# Patient Record
Sex: FEMALE | Race: WHITE | Employment: OTHER | URBAN - NONMETROPOLITAN AREA
[De-identification: names, ages, dates, MRNs, and addresses within clinical notes are randomized per-mention and may not be internally consistent; named-entity substitution may affect disease eponyms.]

---

## 2019-07-16 ENCOUNTER — OFFICE VISIT (OUTPATIENT)
Dept: PRIMARY CARE CLINIC | Age: 64
End: 2019-07-16
Payer: MEDICARE

## 2019-07-16 VITALS
OXYGEN SATURATION: 98 % | WEIGHT: 116 LBS | HEIGHT: 65 IN | HEART RATE: 82 BPM | TEMPERATURE: 97.4 F | SYSTOLIC BLOOD PRESSURE: 134 MMHG | BODY MASS INDEX: 19.33 KG/M2 | RESPIRATION RATE: 22 BRPM | DIASTOLIC BLOOD PRESSURE: 80 MMHG

## 2019-07-16 DIAGNOSIS — R10.13 EPIGASTRIC PAIN: ICD-10-CM

## 2019-07-16 DIAGNOSIS — Z80.0 FAMILY HISTORY OF PANCREATIC CANCER: ICD-10-CM

## 2019-07-16 DIAGNOSIS — M54.41 CHRONIC BILATERAL LOW BACK PAIN WITH BILATERAL SCIATICA: Primary | ICD-10-CM

## 2019-07-16 DIAGNOSIS — M54.16 LUMBAR RADICULOPATHY: ICD-10-CM

## 2019-07-16 DIAGNOSIS — Z23 NEED FOR PNEUMOCOCCAL VACCINATION: ICD-10-CM

## 2019-07-16 DIAGNOSIS — G89.29 CHRONIC BILATERAL LOW BACK PAIN WITH BILATERAL SCIATICA: Primary | ICD-10-CM

## 2019-07-16 DIAGNOSIS — K52.9 CHRONIC DIARRHEA: ICD-10-CM

## 2019-07-16 DIAGNOSIS — M54.42 CHRONIC BILATERAL LOW BACK PAIN WITH BILATERAL SCIATICA: Primary | ICD-10-CM

## 2019-07-16 DIAGNOSIS — F33.1 MODERATE EPISODE OF RECURRENT MAJOR DEPRESSIVE DISORDER (HCC): ICD-10-CM

## 2019-07-16 DIAGNOSIS — F17.200 SMOKER: ICD-10-CM

## 2019-07-16 DIAGNOSIS — I10 ESSENTIAL HYPERTENSION: ICD-10-CM

## 2019-07-16 DIAGNOSIS — R63.4 WEIGHT LOSS: ICD-10-CM

## 2019-07-16 DIAGNOSIS — Z87.891 PERSONAL HISTORY OF TOBACCO USE: ICD-10-CM

## 2019-07-16 LAB
ALBUMIN SERPL-MCNC: 4.4 G/DL (ref 3.5–5.2)
ALP BLD-CCNC: 85 U/L (ref 35–104)
ALT SERPL-CCNC: 9 U/L (ref 5–33)
AMYLASE: 93 U/L (ref 28–100)
ANION GAP SERPL CALCULATED.3IONS-SCNC: 15 MMOL/L (ref 7–19)
AST SERPL-CCNC: 14 U/L (ref 5–32)
BASOPHILS ABSOLUTE: 0.1 K/UL (ref 0–0.2)
BASOPHILS RELATIVE PERCENT: 0.9 % (ref 0–1)
BILIRUB SERPL-MCNC: <0.2 MG/DL (ref 0.2–1.2)
BUN BLDV-MCNC: 13 MG/DL (ref 8–23)
CALCIUM SERPL-MCNC: 9.2 MG/DL (ref 8.8–10.2)
CHLORIDE BLD-SCNC: 105 MMOL/L (ref 98–111)
CO2: 22 MMOL/L (ref 22–29)
CREAT SERPL-MCNC: 0.7 MG/DL (ref 0.5–0.9)
EOSINOPHILS ABSOLUTE: 0.1 K/UL (ref 0–0.6)
EOSINOPHILS RELATIVE PERCENT: 1.3 % (ref 0–5)
GFR NON-AFRICAN AMERICAN: >60
GLUCOSE BLD-MCNC: 105 MG/DL (ref 74–109)
HCT VFR BLD CALC: 46.7 % (ref 37–47)
HEMOGLOBIN: 14.8 G/DL (ref 12–16)
LIPASE: 31 U/L (ref 13–60)
LYMPHOCYTES ABSOLUTE: 2.2 K/UL (ref 1.1–4.5)
LYMPHOCYTES RELATIVE PERCENT: 23.4 % (ref 20–40)
MCH RBC QN AUTO: 30.5 PG (ref 27–31)
MCHC RBC AUTO-ENTMCNC: 31.7 G/DL (ref 33–37)
MCV RBC AUTO: 96.1 FL (ref 81–99)
MONOCYTES ABSOLUTE: 0.7 K/UL (ref 0–0.9)
MONOCYTES RELATIVE PERCENT: 6.9 % (ref 0–10)
NEUTROPHILS ABSOLUTE: 6.3 K/UL (ref 1.5–7.5)
NEUTROPHILS RELATIVE PERCENT: 67.2 % (ref 50–65)
PDW BLD-RTO: 13 % (ref 11.5–14.5)
PLATELET # BLD: 260 K/UL (ref 130–400)
PMV BLD AUTO: 10.6 FL (ref 9.4–12.3)
POTASSIUM SERPL-SCNC: 3.6 MMOL/L (ref 3.5–5)
RBC # BLD: 4.86 M/UL (ref 4.2–5.4)
SODIUM BLD-SCNC: 142 MMOL/L (ref 136–145)
TOTAL PROTEIN: 7.6 G/DL (ref 6.6–8.7)
TSH SERPL DL<=0.05 MIU/L-ACNC: 1.18 UIU/ML (ref 0.27–4.2)
WBC # BLD: 9.4 K/UL (ref 4.8–10.8)

## 2019-07-16 PROCEDURE — G0296 VISIT TO DETERM LDCT ELIG: HCPCS | Performed by: FAMILY MEDICINE

## 2019-07-16 PROCEDURE — G0009 ADMIN PNEUMOCOCCAL VACCINE: HCPCS | Performed by: FAMILY MEDICINE

## 2019-07-16 PROCEDURE — 99204 OFFICE O/P NEW MOD 45 MIN: CPT | Performed by: FAMILY MEDICINE

## 2019-07-16 PROCEDURE — 36415 COLL VENOUS BLD VENIPUNCTURE: CPT | Performed by: FAMILY MEDICINE

## 2019-07-16 PROCEDURE — 90670 PCV13 VACCINE IM: CPT | Performed by: FAMILY MEDICINE

## 2019-07-16 RX ORDER — AMLODIPINE BESYLATE 2.5 MG/1
2.5 TABLET ORAL DAILY
COMMUNITY
End: 2020-04-21

## 2019-07-16 RX ORDER — FLUOXETINE HYDROCHLORIDE 40 MG/1
40 CAPSULE ORAL DAILY
COMMUNITY
End: 2019-10-16 | Stop reason: ALTCHOICE

## 2019-07-16 RX ORDER — M-VIT,TX,IRON,MINS/CALC/FOLIC 27MG-0.4MG
1 TABLET ORAL DAILY
COMMUNITY
End: 2019-10-16

## 2019-07-16 RX ORDER — TRAZODONE HYDROCHLORIDE 100 MG/1
100 TABLET ORAL NIGHTLY
COMMUNITY
End: 2019-11-21 | Stop reason: SDUPTHER

## 2019-07-16 RX ORDER — LORATADINE 10 MG/1
10 TABLET ORAL DAILY
COMMUNITY
End: 2019-10-16

## 2019-07-16 RX ORDER — GABAPENTIN 300 MG/1
300 CAPSULE ORAL 3 TIMES DAILY
Qty: 90 CAPSULE | Refills: 5 | Status: SHIPPED | OUTPATIENT
Start: 2019-07-16 | End: 2019-08-19 | Stop reason: SDUPTHER

## 2019-07-16 RX ORDER — ROPINIROLE 0.5 MG/1
0.5 TABLET, FILM COATED ORAL 2 TIMES DAILY
COMMUNITY
End: 2019-07-16 | Stop reason: ALTCHOICE

## 2019-07-16 RX ORDER — TOPIRAMATE 50 MG/1
50 TABLET, FILM COATED ORAL 2 TIMES DAILY
COMMUNITY
End: 2020-07-06 | Stop reason: SDUPTHER

## 2019-07-16 ASSESSMENT — ENCOUNTER SYMPTOMS
NAUSEA: 0
COUGH: 0
WHEEZING: 0
VOMITING: 0
EYE DISCHARGE: 0
BACK PAIN: 1
COLOR CHANGE: 0
DIARRHEA: 1
ABDOMINAL PAIN: 0

## 2019-07-16 ASSESSMENT — PATIENT HEALTH QUESTIONNAIRE - PHQ9
1. LITTLE INTEREST OR PLEASURE IN DOING THINGS: 0
SUM OF ALL RESPONSES TO PHQ QUESTIONS 1-9: 0
SUM OF ALL RESPONSES TO PHQ9 QUESTIONS 1 & 2: 0
SUM OF ALL RESPONSES TO PHQ QUESTIONS 1-9: 0
2. FEELING DOWN, DEPRESSED OR HOPELESS: 0

## 2019-07-17 ENCOUNTER — TRANSCRIBE ORDERS (OUTPATIENT)
Dept: ADMINISTRATIVE | Facility: HOSPITAL | Age: 64
End: 2019-07-17

## 2019-07-17 DIAGNOSIS — Z80.0 FHX: PANCREATIC CANCER: ICD-10-CM

## 2019-07-17 DIAGNOSIS — Z80.0 FH: PANCREATIC CANCER: Primary | ICD-10-CM

## 2019-07-17 DIAGNOSIS — Z87.891 PERSONAL HISTORY OF TOBACCO USE, PRESENTING HAZARDS TO HEALTH: ICD-10-CM

## 2019-07-23 ENCOUNTER — APPOINTMENT (OUTPATIENT)
Dept: CT IMAGING | Facility: HOSPITAL | Age: 64
End: 2019-07-23

## 2019-07-26 ENCOUNTER — APPOINTMENT (OUTPATIENT)
Dept: CT IMAGING | Facility: HOSPITAL | Age: 64
End: 2019-07-26

## 2019-08-06 ENCOUNTER — HOSPITAL ENCOUNTER (OUTPATIENT)
Dept: CT IMAGING | Facility: HOSPITAL | Age: 64
Discharge: HOME | End: 2019-08-06
Admitting: FAMILY MEDICINE

## 2019-08-06 DIAGNOSIS — Z87.891 PERSONAL HISTORY OF TOBACCO USE, PRESENTING HAZARDS TO HEALTH: ICD-10-CM

## 2019-08-06 DIAGNOSIS — R63.4 WEIGHT LOSS: ICD-10-CM

## 2019-08-06 DIAGNOSIS — F17.200 SMOKER: ICD-10-CM

## 2019-08-06 DIAGNOSIS — Z87.891 PERSONAL HISTORY OF TOBACCO USE: ICD-10-CM

## 2019-08-06 DIAGNOSIS — K52.9 CHRONIC DIARRHEA: ICD-10-CM

## 2019-08-06 DIAGNOSIS — R10.13 EPIGASTRIC PAIN: ICD-10-CM

## 2019-08-06 DIAGNOSIS — Z80.0 FAMILY HISTORY OF PANCREATIC CANCER: ICD-10-CM

## 2019-08-06 DIAGNOSIS — Z80.0 FHX: PANCREATIC CANCER: ICD-10-CM

## 2019-08-06 DIAGNOSIS — R93.89 ABNORMAL CHEST CT: ICD-10-CM

## 2019-08-06 DIAGNOSIS — R91.8 LUNG MASS: Primary | ICD-10-CM

## 2019-08-06 LAB — CREAT BLDA-MCNC: 0.9 MG/DL (ref 0.6–1.3)

## 2019-08-06 PROCEDURE — 25010000002 IOPAMIDOL 61 % SOLUTION: Performed by: FAMILY MEDICINE

## 2019-08-06 PROCEDURE — G0297 LDCT FOR LUNG CA SCREEN: HCPCS

## 2019-08-06 PROCEDURE — 74177 CT ABD & PELVIS W/CONTRAST: CPT

## 2019-08-06 PROCEDURE — 82565 ASSAY OF CREATININE: CPT

## 2019-08-06 RX ADMIN — IOPAMIDOL 50 ML: 612 INJECTION, SOLUTION INTRAVENOUS at 08:59

## 2019-08-06 RX ADMIN — IOPAMIDOL 100 ML: 612 INJECTION, SOLUTION INTRAVENOUS at 08:59

## 2019-08-08 ENCOUNTER — TRANSCRIBE ORDERS (OUTPATIENT)
Dept: ADMINISTRATIVE | Facility: HOSPITAL | Age: 64
End: 2019-08-08

## 2019-08-08 ENCOUNTER — TELEPHONE (OUTPATIENT)
Dept: PRIMARY CARE CLINIC | Age: 64
End: 2019-08-08

## 2019-08-08 DIAGNOSIS — F17.200 SMOKER: ICD-10-CM

## 2019-08-08 DIAGNOSIS — R93.89 ABNORMAL CHEST CT: ICD-10-CM

## 2019-08-08 DIAGNOSIS — R91.8 LUNG MASS: Primary | ICD-10-CM

## 2019-08-08 DIAGNOSIS — R63.4 WEIGHT LOSS: ICD-10-CM

## 2019-08-12 DIAGNOSIS — R93.89 ABNORMAL CT SCAN: ICD-10-CM

## 2019-08-12 DIAGNOSIS — R63.4 LOSS OF WEIGHT: ICD-10-CM

## 2019-08-12 DIAGNOSIS — R91.1 LUNG NODULE: Primary | ICD-10-CM

## 2019-08-12 DIAGNOSIS — F17.200 SMOKES: ICD-10-CM

## 2019-08-14 ENCOUNTER — HOSPITAL ENCOUNTER (OUTPATIENT)
Dept: CT IMAGING | Facility: HOSPITAL | Age: 64
Discharge: HOME | End: 2019-08-14

## 2019-08-14 ENCOUNTER — HOSPITAL ENCOUNTER (OUTPATIENT)
Dept: CT IMAGING | Facility: HOSPITAL | Age: 64
Discharge: HOME | End: 2019-08-14
Admitting: FAMILY MEDICINE

## 2019-08-14 DIAGNOSIS — R91.8 LUNG MASS: ICD-10-CM

## 2019-08-14 DIAGNOSIS — F17.200 SMOKER: ICD-10-CM

## 2019-08-14 DIAGNOSIS — R93.89 ABNORMAL CHEST CT: ICD-10-CM

## 2019-08-14 DIAGNOSIS — R63.4 WEIGHT LOSS: ICD-10-CM

## 2019-08-14 PROCEDURE — 0 FLUDEOXYGLUCOSE F18 SOLUTION: Performed by: FAMILY MEDICINE

## 2019-08-14 PROCEDURE — A9552 F18 FDG: HCPCS | Performed by: FAMILY MEDICINE

## 2019-08-14 PROCEDURE — 78815 PET IMAGE W/CT SKULL-THIGH: CPT

## 2019-08-14 RX ADMIN — FLUDEOXYGLUCOSE F18 1 DOSE: 300 INJECTION INTRAVENOUS at 08:59

## 2019-08-19 DIAGNOSIS — M54.16 LUMBAR RADICULOPATHY: ICD-10-CM

## 2019-08-19 DIAGNOSIS — M54.41 CHRONIC BILATERAL LOW BACK PAIN WITH BILATERAL SCIATICA: ICD-10-CM

## 2019-08-19 DIAGNOSIS — M54.42 CHRONIC BILATERAL LOW BACK PAIN WITH BILATERAL SCIATICA: ICD-10-CM

## 2019-08-19 DIAGNOSIS — G89.29 CHRONIC BILATERAL LOW BACK PAIN WITH BILATERAL SCIATICA: ICD-10-CM

## 2019-08-19 RX ORDER — GABAPENTIN 300 MG/1
300 CAPSULE ORAL 3 TIMES DAILY
Qty: 90 CAPSULE | Refills: 0 | Status: SHIPPED | OUTPATIENT
Start: 2019-08-19 | End: 2020-03-23

## 2019-09-09 NOTE — PROGRESS NOTES
Subjective   Georgina Bernard is a 63 y.o. female.     Background: Pt with 70 pk year smoking hx, 13 mm LLL nodule, 4 mm rml nodule.  PET scan indicated 12 mm intraficssural nodule and 3 mm rml nodule    Chief Complaint   Patient presents with   • abnormal CT Scan        History of Present Illness   Pt reports moderate intermittent dyspnea on exertion in chest associated with wheeze and alleviated by rest and not helped well by inhalers.  She underwent a sleep study in [place] and was told she needs oxygen at night.     Medical/Family/Social History   has a past medical history of Arthritis and Hypertension.   has a past surgical history that includes Hysterectomy; Cholecystectomy; Appendectomy; Gallbladder surgery; Hysterectomy; Appendectomy; Tubal ligation; and Carpal tunnel release.  family history includes Cancer in her brother and mother; Heart failure in her father.   reports that she has been smoking.  She has a 20.00 pack-year smoking history. She has never used smokeless tobacco. She reports that she does not drink alcohol or use drugs.  She is cutting back on smoking  Allergies   Allergen Reactions   • Morphine Hives     Medications    Current Outpatient Medications:   •  albuterol sulfate  (90 Base) MCG/ACT inhaler, INHALE 2 PUFFS INTO THE LUNGS EVERY 6HRS AS NEEDED FOR WHEEZING, Disp: , Rfl: 2  •  amLODIPine (NORVASC) 2.5 MG tablet, Take 2.5 mg by mouth Daily., Disp: , Rfl: 0  •  aspirin 81 MG tablet, Take 81 mg by mouth., Disp: , Rfl:   •  Cholecalciferol (VITAMIN D3) 5000 units capsule capsule, Take  by mouth Daily., Disp: , Rfl: 4  •  FLUoxetine (PROzac) 40 MG capsule, Take 40 mg by mouth Daily., Disp: , Rfl: 1  •  gabapentin (NEURONTIN) 300 MG capsule, TAKE 1 CAPSULE BY MOUTH 3 TIMES DAILY  DAYS. INTENDED SUPPLY: 30 DAYS, Disp: , Rfl: 5  •  loratadine (CLARITIN) 10 MG tablet, Take 10 mg by mouth Daily., Disp: , Rfl: 10  •  topiramate (TOPAMAX) 50 MG tablet, Take 50 mg by mouth., Disp:  ", Rfl:   •  traZODone (DESYREL) 100 MG tablet, Take 100 mg by mouth., Disp: , Rfl:     Review of Systems   Constitutional: Negative for chills and fever.   HENT: Negative for trouble swallowing and voice change.    Eyes: Negative for photophobia and visual disturbance.   Respiratory: Positive for cough, shortness of breath and wheezing.    Gastrointestinal: Negative for abdominal pain, nausea, vomiting and GERD.   Genitourinary: Negative for difficulty urinating and hematuria.   Musculoskeletal: Negative for gait problem and joint swelling.   Skin: Negative for pallor and skin lesions.   Neurological: Negative for tremors, speech difficulty, weakness, light-headedness and confusion.   Hematological: Negative for adenopathy. Does not bruise/bleed easily.   Psychiatric/Behavioral: The patient is not nervous/anxious.      Objective   /70   Pulse 78   Ht 165.1 cm (65\")   Wt 53.5 kg (118 lb)   SpO2 97% Comment: RA  BMI 19.64 kg/m²   Physical Exam   Constitutional: She appears well-developed. She is active.  Non-toxic appearance. She does not have a sickly appearance. She does not appear ill. No distress.   HENT:   Head: Atraumatic.   Nose: Nose normal.   Eyes: Conjunctivae and EOM are normal. No scleral icterus.   Neck: Neck supple.   Cardiovascular: Normal rate, regular rhythm, S1 normal and S2 normal.   Pulmonary/Chest: Effort normal. No accessory muscle usage. She has decreased breath sounds. She has no wheezes.   Abdominal: Soft. She exhibits no distension. There is no tenderness.   Musculoskeletal: She exhibits no deformity.   Lymphadenopathy:     She has no cervical adenopathy.   Neurological: She is alert.   Skin: Skin is warm. No rash noted.   Psychiatric: She has a normal mood and affect.        -----------------------------------------------------------------------------------------------  Recent Imaging:  Nm Pet Skull Base To Mid Thigh    Result Date: 8/14/2019  Impression:  12 mm lingular " perifissural pulmonary nodule has low-level FDG uptake below physiologic baseline. Given location, shape, and degree of FDG uptake, this may represent an intrapulmonary lymph node. However, continued surveillance is recommended as neoplasm remains within the differential.  3 mm right middle lobe pulmonary nodule is too small to characterize by PET/CT. Continued CT surveillance is recommended given severe emphysema.  No other significant abnormalities identified. This report was finalized on 08/14/2019 11:32 by Dr. Dalton Sarkar MD.    EXAMINATION: CT CHEST LOW DOSE WO-      8/6/2019 8:56 AM CDT     HISTORY: PER HX OF TOBACCO ABUSE; Z87.891-Personal history of nicotine  dependence     In order to have a CT radiation dose as low as reasonably achievable  Automated Exposure Control was utilized for adjustment of the mA and/or  KV according to patient size.     DLP in mGycm= 59.     Routine protocol noncontrast low dose screening chest CT.     Heart size is normal.  Granulomatous lymph node calcification is seen at the right hilum.  There is no mediastinal mass.  No pleural effusion.     Mildly hyperexpanded lungs. A few calcified granulomas are present  within the right middle lobe.     4 x 3 mm right middle lobe nodule on series 3 image 111.     13 x 7 mm left lower lobe soft tissue nodule associated with the major  fissure on series 3 images 124-129.     Summary:  1. 4 mm right middle lobe and 13 mm left lower lobe lung nodules.  2. Based on Lung-RADS Version1.1 (Release date 2019) this represents a  lung RADS score of 4A (based on the size of the solid left lower lobe  nodule). Probably suspicious finding. Additional diagnostic testing will  be needed.  3. Management options include 3 month LDCT and PET/CT imaging.  4. The 4 mm right middle lobe lung nodule can be followed with LDCT in  one year.    This report was finalized on 08/06/2019 09:33 by Dr. Alfonso Horner MD.    My interpretation of CT: nodule left lower  field  -----------------------------------------------------------------------------------------------  Pulmonary Functions Testing Results:  FEV1   Date Value Ref Range Status   09/11/2019 51% liters Final     FVC   Date Value Ref Range Status   09/11/2019 63% liters Final     FEV1/FVC   Date Value Ref Range Status   09/11/2019 64.51 % Final     DLCO   Date Value Ref Range Status   09/11/2019 59% ml/mmHg sec Final      My interpretation of PFT: moderate airflow obstruction, reduced dlco  -----------------------------------------------------------------------------------------------  Assessment/Plan   Problem List Items Addressed This Visit        Respiratory    Lung nodules - Primary    Relevant Orders    CT Chest Without Contrast    Centrilobular emphysema (CMS/[facility])    Relevant Medications    albuterol sulfate  (90 Base) MCG/ACT inhaler    loratadine (CLARITIN) 10 MG tablet    Other Relevant Orders    Pulmonary Function Test (Completed)    Shortness of breath    Relevant Orders    Overnight Sleep Oximetry Study    Nocturnal hypoxemia    Relevant Orders    Overnight Sleep Oximetry Study      Other Visit Diagnoses     Stage 2 moderate COPD by GOLD classification (CMS/[facility])        Relevant Medications    albuterol sulfate  (90 Base) MCG/ACT inhaler    loratadine (CLARITIN) 10 MG tablet        Patient's Body mass index is 19.64 kg/m². BMI is below normal parameters. Recommendations include: treating the underlying disease process.      For the lung nodule, plan repeat ct in 3 mos.  Suspect intrafisureal LN, cannot rule out cancer.  For the copd, continue albuterol, add trelegy sample for 2 weeks.  Check alpha 1 antitrypsin for the emphysema  She probably has some pulmonary cachexia, although malignancy is not ruled out and so cancer related cachexia is in differential  Overnight oximetry for hypoxema       Electronically signed by Ba Wray MD, 9/11/2019, 11:54 AM

## 2019-09-11 ENCOUNTER — OFFICE VISIT (OUTPATIENT)
Dept: PULMONOLOGY | Facility: CLINIC | Age: 64
End: 2019-09-11

## 2019-09-11 VITALS
OXYGEN SATURATION: 97 % | DIASTOLIC BLOOD PRESSURE: 70 MMHG | SYSTOLIC BLOOD PRESSURE: 126 MMHG | HEIGHT: 65 IN | BODY MASS INDEX: 19.66 KG/M2 | WEIGHT: 118 LBS | HEART RATE: 78 BPM

## 2019-09-11 DIAGNOSIS — R91.8 LUNG NODULES: Primary | ICD-10-CM

## 2019-09-11 DIAGNOSIS — R06.02 SHORTNESS OF BREATH: ICD-10-CM

## 2019-09-11 DIAGNOSIS — G47.34 NOCTURNAL HYPOXEMIA: ICD-10-CM

## 2019-09-11 DIAGNOSIS — J43.2 CENTRILOBULAR EMPHYSEMA (HCC): ICD-10-CM

## 2019-09-11 DIAGNOSIS — J44.9 STAGE 2 MODERATE COPD BY GOLD CLASSIFICATION (HCC): ICD-10-CM

## 2019-09-11 LAB
DIFF CAP.CO: NORMAL ML/MMHG SEC
FEV1/FVC: 64.51 %
FEV1: NORMAL LITERS
FVC VOL RESPIRATORY: NORMAL LITERS

## 2019-09-11 PROCEDURE — 94010 BREATHING CAPACITY TEST: CPT | Performed by: INTERNAL MEDICINE

## 2019-09-11 PROCEDURE — 99204 OFFICE O/P NEW MOD 45 MIN: CPT | Performed by: INTERNAL MEDICINE

## 2019-09-11 PROCEDURE — 94729 DIFFUSING CAPACITY: CPT | Performed by: INTERNAL MEDICINE

## 2019-09-11 RX ORDER — LORATADINE 10 MG/1
10 TABLET ORAL DAILY
Refills: 10 | COMMUNITY
Start: 2019-08-12 | End: 2020-01-14 | Stop reason: ALTCHOICE

## 2019-09-11 RX ORDER — TOPIRAMATE 50 MG/1
50 TABLET, FILM COATED ORAL
COMMUNITY

## 2019-09-11 RX ORDER — GABAPENTIN 300 MG/1
CAPSULE ORAL
Refills: 5 | COMMUNITY
Start: 2019-08-19

## 2019-09-11 RX ORDER — AMLODIPINE BESYLATE 2.5 MG/1
2.5 TABLET ORAL DAILY
Refills: 0 | COMMUNITY
Start: 2019-08-29

## 2019-09-11 RX ORDER — ALBUTEROL SULFATE 90 UG/1
AEROSOL, METERED RESPIRATORY (INHALATION)
Refills: 2 | COMMUNITY
Start: 2019-06-06 | End: 2021-01-11 | Stop reason: SDUPTHER

## 2019-09-11 RX ORDER — TRAZODONE HYDROCHLORIDE 100 MG/1
100 TABLET ORAL
COMMUNITY

## 2019-09-11 RX ORDER — FLUOXETINE HYDROCHLORIDE 40 MG/1
40 CAPSULE ORAL DAILY
Refills: 1 | COMMUNITY
Start: 2019-08-12

## 2019-09-11 ASSESSMENT — PULMONARY FUNCTION TESTS: FEV1/FVC: 64.51

## 2019-09-23 DIAGNOSIS — R06.02 SHORTNESS OF BREATH: ICD-10-CM

## 2019-09-23 DIAGNOSIS — J44.9 STAGE 2 MODERATE COPD BY GOLD CLASSIFICATION (HCC): Primary | ICD-10-CM

## 2019-09-23 DIAGNOSIS — G47.34 NOCTURNAL HYPOXEMIA: ICD-10-CM

## 2019-09-23 NOTE — PROGRESS NOTES
Please call the patient regarding her abnormal result.  This confirms she needs oxygen at night as was recommended.  Can rx from vendor of choice

## 2019-09-24 DIAGNOSIS — J44.9 STAGE 2 MODERATE COPD BY GOLD CLASSIFICATION (HCC): ICD-10-CM

## 2019-09-24 NOTE — PROGRESS NOTES
Let pt know this looked ok.  Alpha one gene is normal.  Nothing else to do for that.  Keep follow up as planned

## 2019-10-03 NOTE — TELEPHONE ENCOUNTER
Patient called and is needing a refill for Trelegy. Patient stated that you wasn't certain if her insurance would cover it or not.

## 2019-10-16 ENCOUNTER — OFFICE VISIT (OUTPATIENT)
Dept: PRIMARY CARE CLINIC | Age: 64
End: 2019-10-16
Payer: MEDICARE

## 2019-10-16 VITALS
TEMPERATURE: 97.9 F | BODY MASS INDEX: 19.83 KG/M2 | DIASTOLIC BLOOD PRESSURE: 64 MMHG | WEIGHT: 119 LBS | HEART RATE: 76 BPM | OXYGEN SATURATION: 97 % | SYSTOLIC BLOOD PRESSURE: 136 MMHG | RESPIRATION RATE: 22 BRPM | HEIGHT: 65 IN

## 2019-10-16 DIAGNOSIS — Z72.0 TOBACCO ABUSE: ICD-10-CM

## 2019-10-16 DIAGNOSIS — R91.1 LUNG NODULE: ICD-10-CM

## 2019-10-16 DIAGNOSIS — M54.16 LUMBAR RADICULOPATHY: ICD-10-CM

## 2019-10-16 DIAGNOSIS — I10 ESSENTIAL HYPERTENSION: ICD-10-CM

## 2019-10-16 DIAGNOSIS — G89.29 CHRONIC BILATERAL LOW BACK PAIN WITH BILATERAL SCIATICA: Primary | ICD-10-CM

## 2019-10-16 DIAGNOSIS — Z23 NEED FOR INFLUENZA VACCINATION: ICD-10-CM

## 2019-10-16 DIAGNOSIS — M54.41 CHRONIC BILATERAL LOW BACK PAIN WITH BILATERAL SCIATICA: Primary | ICD-10-CM

## 2019-10-16 DIAGNOSIS — J44.9 STAGE 2 MODERATE COPD BY GOLD CLASSIFICATION (HCC): ICD-10-CM

## 2019-10-16 DIAGNOSIS — M54.42 CHRONIC BILATERAL LOW BACK PAIN WITH BILATERAL SCIATICA: Primary | ICD-10-CM

## 2019-10-16 PROCEDURE — 90653 IIV ADJUVANT VACCINE IM: CPT | Performed by: FAMILY MEDICINE

## 2019-10-16 PROCEDURE — 99214 OFFICE O/P EST MOD 30 MIN: CPT | Performed by: FAMILY MEDICINE

## 2019-10-16 PROCEDURE — G0008 ADMIN INFLUENZA VIRUS VAC: HCPCS | Performed by: FAMILY MEDICINE

## 2019-10-16 RX ORDER — ESCITALOPRAM OXALATE 10 MG/1
10 TABLET ORAL DAILY
Qty: 30 TABLET | Refills: 3 | Status: SHIPPED | OUTPATIENT
Start: 2019-10-16 | End: 2019-11-07 | Stop reason: SDUPTHER

## 2019-10-16 RX ORDER — VARENICLINE TARTRATE 0.5 MG/1
.5-1 TABLET, FILM COATED ORAL SEE ADMIN INSTRUCTIONS
Qty: 57 TABLET | Refills: 0 | Status: SHIPPED | OUTPATIENT
Start: 2019-10-16 | End: 2019-11-05 | Stop reason: SDUPTHER

## 2019-10-16 ASSESSMENT — ENCOUNTER SYMPTOMS
COLOR CHANGE: 0
VOMITING: 0
COUGH: 0
ABDOMINAL PAIN: 0
DIARRHEA: 0
NAUSEA: 0
EYE DISCHARGE: 0
WHEEZING: 0
BACK PAIN: 1

## 2019-11-05 RX ORDER — VARENICLINE TARTRATE 0.5 MG/1
TABLET, FILM COATED ORAL
Qty: 57 TABLET | Refills: 3 | Status: SHIPPED | OUTPATIENT
Start: 2019-11-05 | End: 2020-07-13

## 2019-11-07 RX ORDER — ESCITALOPRAM OXALATE 10 MG/1
10 TABLET ORAL DAILY
Qty: 90 TABLET | Refills: 3 | Status: SHIPPED | OUTPATIENT
Start: 2019-11-07 | End: 2019-11-21 | Stop reason: SDUPTHER

## 2019-11-21 ENCOUNTER — OFFICE VISIT (OUTPATIENT)
Dept: PRIMARY CARE CLINIC | Age: 64
End: 2019-11-21
Payer: MEDICARE

## 2019-11-21 VITALS
BODY MASS INDEX: 20.33 KG/M2 | SYSTOLIC BLOOD PRESSURE: 126 MMHG | DIASTOLIC BLOOD PRESSURE: 70 MMHG | HEIGHT: 65 IN | RESPIRATION RATE: 22 BRPM | TEMPERATURE: 98.1 F | WEIGHT: 122 LBS | OXYGEN SATURATION: 95 % | HEART RATE: 85 BPM

## 2019-11-21 DIAGNOSIS — J44.9 CHRONIC OBSTRUCTIVE PULMONARY DISEASE, UNSPECIFIED COPD TYPE (HCC): ICD-10-CM

## 2019-11-21 DIAGNOSIS — F41.9 ANXIETY: ICD-10-CM

## 2019-11-21 DIAGNOSIS — G89.29 CHRONIC BILATERAL LOW BACK PAIN WITHOUT SCIATICA: Primary | ICD-10-CM

## 2019-11-21 DIAGNOSIS — M54.50 CHRONIC BILATERAL LOW BACK PAIN WITHOUT SCIATICA: Primary | ICD-10-CM

## 2019-11-21 DIAGNOSIS — Z00.00 ROUTINE GENERAL MEDICAL EXAMINATION AT A HEALTH CARE FACILITY: ICD-10-CM

## 2019-11-21 PROCEDURE — 99214 OFFICE O/P EST MOD 30 MIN: CPT | Performed by: FAMILY MEDICINE

## 2019-11-21 PROCEDURE — G0438 PPPS, INITIAL VISIT: HCPCS | Performed by: FAMILY MEDICINE

## 2019-11-21 RX ORDER — ESCITALOPRAM OXALATE 20 MG/1
20 TABLET ORAL DAILY
Qty: 90 TABLET | Refills: 3 | Status: SHIPPED | OUTPATIENT
Start: 2019-11-21 | End: 2020-04-27

## 2019-11-21 RX ORDER — TRAZODONE HYDROCHLORIDE 150 MG/1
150 TABLET ORAL NIGHTLY
Qty: 90 TABLET | Refills: 3 | Status: SHIPPED | OUTPATIENT
Start: 2019-11-21 | End: 2020-04-27

## 2019-11-21 RX ORDER — TIZANIDINE 4 MG/1
4 TABLET ORAL 3 TIMES DAILY
Qty: 30 TABLET | Refills: 0 | Status: SHIPPED | OUTPATIENT
Start: 2019-11-21 | End: 2020-06-25 | Stop reason: SDUPTHER

## 2019-11-21 ASSESSMENT — ENCOUNTER SYMPTOMS
BACK PAIN: 1
COUGH: 0
NAUSEA: 0
DIARRHEA: 0
WHEEZING: 0
COLOR CHANGE: 0
VOMITING: 0
EYE DISCHARGE: 0
ABDOMINAL PAIN: 0

## 2019-11-21 ASSESSMENT — PATIENT HEALTH QUESTIONNAIRE - PHQ9
SUM OF ALL RESPONSES TO PHQ QUESTIONS 1-9: 0
SUM OF ALL RESPONSES TO PHQ QUESTIONS 1-9: 0

## 2019-11-21 ASSESSMENT — LIFESTYLE VARIABLES: HOW OFTEN DO YOU HAVE A DRINK CONTAINING ALCOHOL: 0

## 2019-11-26 ENCOUNTER — HOSPITAL ENCOUNTER (OUTPATIENT)
Dept: GENERAL RADIOLOGY | Age: 64
Discharge: HOME | End: 2019-11-26
Payer: MEDICARE

## 2019-11-26 DIAGNOSIS — G89.29 CHRONIC BILATERAL LOW BACK PAIN WITHOUT SCIATICA: ICD-10-CM

## 2019-11-26 DIAGNOSIS — M54.50 CHRONIC BILATERAL LOW BACK PAIN WITHOUT SCIATICA: ICD-10-CM

## 2019-11-26 PROCEDURE — 72100 X-RAY EXAM L-S SPINE 2/3 VWS: CPT

## 2019-11-27 DIAGNOSIS — G89.29 CHRONIC BILATERAL LOW BACK PAIN WITHOUT SCIATICA: Primary | ICD-10-CM

## 2019-11-27 DIAGNOSIS — M54.50 CHRONIC BILATERAL LOW BACK PAIN WITHOUT SCIATICA: Primary | ICD-10-CM

## 2019-12-12 ENCOUNTER — HOSPITAL ENCOUNTER (OUTPATIENT)
Dept: PHYSICAL THERAPY | Age: 64
Setting detail: THERAPIES SERIES
Discharge: HOME | End: 2019-12-12
Payer: MEDICARE

## 2019-12-12 PROCEDURE — 97162 PT EVAL MOD COMPLEX 30 MIN: CPT

## 2019-12-12 PROCEDURE — 97110 THERAPEUTIC EXERCISES: CPT

## 2019-12-12 ASSESSMENT — PAIN DESCRIPTION - DESCRIPTORS: DESCRIPTORS: DULL;ACHING;NAGGING

## 2019-12-12 ASSESSMENT — PAIN DESCRIPTION - FREQUENCY: FREQUENCY: CONTINUOUS

## 2019-12-12 ASSESSMENT — PAIN DESCRIPTION - LOCATION: LOCATION: BACK

## 2019-12-12 ASSESSMENT — PAIN SCALES - GENERAL: PAINLEVEL_OUTOF10: 8

## 2019-12-12 ASSESSMENT — PAIN DESCRIPTION - ORIENTATION: ORIENTATION: RIGHT;LEFT

## 2019-12-19 ENCOUNTER — HOSPITAL ENCOUNTER (OUTPATIENT)
Dept: PHYSICAL THERAPY | Age: 64
Setting detail: THERAPIES SERIES
Discharge: HOME | End: 2019-12-19
Payer: MEDICARE

## 2019-12-19 PROCEDURE — 97110 THERAPEUTIC EXERCISES: CPT

## 2019-12-19 ASSESSMENT — PAIN SCALES - GENERAL: PAINLEVEL_OUTOF10: 9

## 2019-12-19 ASSESSMENT — PAIN DESCRIPTION - ORIENTATION: ORIENTATION: RIGHT;LEFT

## 2019-12-19 ASSESSMENT — PAIN DESCRIPTION - PAIN TYPE: TYPE: CHRONIC PAIN

## 2019-12-19 ASSESSMENT — PAIN DESCRIPTION - LOCATION: LOCATION: BACK

## 2019-12-23 ENCOUNTER — HOSPITAL ENCOUNTER (OUTPATIENT)
Dept: PHYSICAL THERAPY | Age: 64
Setting detail: THERAPIES SERIES
Discharge: HOME | End: 2019-12-23
Payer: MEDICARE

## 2019-12-23 PROCEDURE — 97110 THERAPEUTIC EXERCISES: CPT

## 2019-12-23 ASSESSMENT — PAIN SCALES - GENERAL: PAINLEVEL_OUTOF10: 6

## 2019-12-23 ASSESSMENT — PAIN DESCRIPTION - PAIN TYPE: TYPE: CHRONIC PAIN

## 2019-12-23 ASSESSMENT — PAIN DESCRIPTION - ORIENTATION: ORIENTATION: RIGHT;LEFT

## 2019-12-23 ASSESSMENT — PAIN DESCRIPTION - LOCATION: LOCATION: BACK

## 2019-12-27 ENCOUNTER — HOSPITAL ENCOUNTER (OUTPATIENT)
Dept: PHYSICAL THERAPY | Age: 64
Setting detail: THERAPIES SERIES
End: 2019-12-27
Payer: MEDICARE

## 2020-01-02 ENCOUNTER — HOSPITAL ENCOUNTER (OUTPATIENT)
Dept: PHYSICAL THERAPY | Age: 65
Setting detail: THERAPIES SERIES
Discharge: HOME | End: 2020-01-02
Payer: MEDICARE

## 2020-01-02 PROCEDURE — 97110 THERAPEUTIC EXERCISES: CPT

## 2020-01-02 ASSESSMENT — PAIN DESCRIPTION - ORIENTATION: ORIENTATION: RIGHT

## 2020-01-02 ASSESSMENT — PAIN DESCRIPTION - LOCATION: LOCATION: BACK

## 2020-01-02 ASSESSMENT — PAIN SCALES - GENERAL: PAINLEVEL_OUTOF10: 9

## 2020-01-02 ASSESSMENT — PAIN DESCRIPTION - PAIN TYPE: TYPE: CHRONIC PAIN

## 2020-01-02 NOTE — PROGRESS NOTES
Physical Therapy  Daily Treatment Note  Date: 2020  Patient Name: Luis Nelson  MRN: [id number]     :   1955    Subjective:   General  Additional Pertinent Hx: 75year old female referred to PT with diagnosis of chronic bilateral low back pain without sciatica. Recent x-ray showed mild degenerative disc disease L5-S1.   Referring Practitioner: Carmine Farrell MD  PT Insurance Information: [facility] [employer] (pre-cert required)  Total # of Visits Approved: 8  Total # of Visits to Date: 4  Plan of Care/Certification Expiration Date: 20  Progress Note Due Date: 20  Subjective: the past 2 says have been bad, i needed help getting out of the car yesterday  Patient Currently in Pain: Yes  Pain Level: 9(8/10 post tx)  Pain Type: Chronic pain  Pain Location: Back  Pain Orientation: Right       Treatment Activities:          Exercises  Exercise 1: supine lower trunk rotation to left x 5 for 5\" -------L side appeared significantly longer to start so performed appropriate mobs 5\" x 5   x 2 sets  Exercise 2: supine right quadratus stretch to left x 5 for 5\" ea  Exercise 3: isometric right hip extension from [facility] position x 5 for 5\"   L today due to it being longer  Exercise 4: axial traction to legs bilaterally x 3 for 10\"  Exercise 5: right single leg bridge x 5   Exercise 6: DKTC x 5 (with towel)  Exercise 7: left isometric resisted hip flexion at 90/90 x 5 for 5\"  R today due it being shorter vs L  Exercise 8: pelvic tilts with folded towel x 10 (poor quality, little motion)  Exercise 9: abdominal series with folded towel under sacrum and with pelvic tilt: arms x 5 ea, legs x 5, arms/legs x 5  Exercise 10: sitting forward reach to left shoe x 5 for 5\"  Exercise 11: sitting right hip retraction x 5 for 5\"  Exercise 12: repeated sit to stand, gatito surface to lower x 1  (very painful)  Exercise 13: bilateral LAQ's x 10  Exercise 14: bilateral leg hamstring curls x 10 red (uncomfortable on R but able to complete unlike previous session)  Exercise 15: foot dorsiflexion with t-band (red) x 10  Exercise 16: standing trunk rotation to left with right glute  x 5  Exercise 17: L-stretch with right arm overhead and left out to side x 5   Exercise 18: standing left hip hikes x 5--NOT today  Exercise 19: bilateral hip abduction x 10 ea  Exercise 20: bilateral step ups x 5 ea ----side steps x 0---rechecked leg length post ex and =        Assessment:   Conditions Requiring Skilled Therapeutic Intervention  Body structures, Functions, Activity limitations: Decreased ADL status; Decreased ROM; Decreased strength;Decreased functional mobility ; Decreased balance;Decreased high-level IADLs; Increased pain;Decreased posture  Assessment: Patient tolerated session fairly well but again LLE was longer than R and required 2 sets of synergistic techniques to correct leg length. Will discuss with primary therapist about kinesiotaping technique for SI region since she feels SI belt not helping and uncomfortable. Able to perform DKTC today with ues of towel but sit to stand still raher painful to attempt  REQUIRES PT FOLLOW UP: Yes             Goals:  Short term goals  Time Frame for Short term goals: 3-4 weeks  Short term goal 1: Patient to be independent with HEP. Short term goal 2: Patient to be independent with trial use of SI belt and heel lift in left shoe. Short term goal 3: Patient to report greater ease performing sit to stand movements. Long term goals  Time Frame for Long term goals : 4-6 weeks  Long term goal 1: Patient to walk with less reported episodes of balance loss. Long term goal 2: Patient to have 25 degrees lumbar extension and 30 degrees right sidebending. Long term goal 3: Patient to have >= 4/5 right knee flexion and foot dorsiflexion, 4+/5 right knee extension. Long term goal 4: Patient to score <= 30% impairment on the Oswestry disability questionnaire.   Patient Goals   Patient goals : Decreased low back pain.    Plan:    Times per week: 2x per week  Plan weeks: 6 deepali  Current Treatment Recommendations: Strengthening, ROM, Gait Training, Modalities, Positioning, Home Exercise Program, Pain Management, Patient/Caregiver Education & Training, Manual Therapy - Joint Manipulation        Therapy Time   Individual Concurrent Group Co-treatment   Time In 5773         Time Out 1022         Minutes Shelley Lagunas PTA    Electronically signed by Jimena Bocanegra PTA on 1/2/2020 at 11:28 AM

## 2020-01-03 ENCOUNTER — HOSPITAL ENCOUNTER (OUTPATIENT)
Dept: PHYSICAL THERAPY | Age: 65
Setting detail: THERAPIES SERIES
Discharge: HOME | End: 2020-01-03
Payer: MEDICARE

## 2020-01-03 PROCEDURE — 97110 THERAPEUTIC EXERCISES: CPT

## 2020-01-03 ASSESSMENT — PAIN DESCRIPTION - LOCATION: LOCATION: BACK

## 2020-01-03 ASSESSMENT — PAIN SCALES - GENERAL: PAINLEVEL_OUTOF10: 10

## 2020-01-03 ASSESSMENT — PAIN DESCRIPTION - DESCRIPTORS: DESCRIPTORS: THROBBING;SORE;ACHING

## 2020-01-03 ASSESSMENT — PAIN DESCRIPTION - PAIN TYPE: TYPE: CHRONIC PAIN

## 2020-01-03 ASSESSMENT — PAIN DESCRIPTION - ORIENTATION: ORIENTATION: RIGHT

## 2020-01-03 NOTE — PROGRESS NOTES
Daily Treatment Note  Date: 1/3/2020  Patient Name: Sandra Abdalla  MRN: [id number]     :   1955    Subjective:   General  Additional Pertinent Hx: 75year old female referred to PT with diagnosis of chronic bilateral low back pain without sciatica. Recent x-ray showed mild degenerative disc disease L5-S1. Response To Previous Treatment: Patient with no complaints from previous session. Family / Caregiver Present: Yes(sister)  Referring Practitioner: Lexii Dumas MD  PT Visit Information  PT Insurance Information: [facility] [employer] (pre-cert required)  Total # of Visits Approved: 8  Total # of Visits to Date: 5  Plan of Care/Certification Expiration Date: 20  Progress Note Due Date: 20  Subjective  Subjective: Bad night. Up and down all night long. Pain Screening  Patient Currently in Pain: Yes  Pain Assessment  Pain Assessment: 0-10  Pain Level: 10(post 8/10)  Pain Type: Chronic pain  Pain Location: Back  Pain Orientation: Right  Pain Descriptors:  Throbbing;Sore;Aching  Vital Signs  Patient Currently in Pain: Yes       Treatment Activities:    Exercises  Exercise 1: supine lower trunk rotation to left x 5 for 5\" -------L side appeared significantly longer to start so performed appropriate mobs 5\" x 5   x 2 sets  Exercise 2: supine right quadratus stretch to left x 5 for 5\" ea  Exercise 3: isometric right hip extension from [facility] position x 5 for 5\"   L today due to it being longer  Exercise 4: axial traction to legs bilaterally x 3 for 10\"  Exercise 5: right single leg bridge x 10  Exercise 6: DKTC x 5 (with towel)  Exercise 7: left isometric resisted hip flexion at 90/90 x 5 for 5\"  R today due it being shorter vs L  Exercise 8: pelvic tilts with folded towel x 10 (poor quality, little motion)  Exercise 9: abdominal series with folded towel under sacrum and with pelvic tilt: arms x 10 ea, legs x 10, arms/legs x 10  Exercise 10: sitting forward reach to left shoe x 5 for 5\"  Exercise dorsiflexion, 4+/5 right knee extension. Long term goal 4: Patient to score <= 30% impairment on the Oswestry disability questionnaire. Patient Goals   Patient goals : Decreased low back pain.     Plan:    Plan  Times per week: 2x per week  Plan weeks: 6 weels  Current Treatment Recommendations: Strengthening, ROM, Gait Training, Modalities, Positioning, Home Exercise Program, Pain Management, Patient/Caregiver Education & Training, Manual Therapy - Joint Manipulation  Timed Code Treatment Minutes: 34 Minutes     Therapy Time   Individual Concurrent Group Co-treatment   Time In 1008         Time Out 9224         Minutes 34         Timed Code Treatment Minutes: Rip Elias, PTA    Electronically signed by Laura Mclaughlin PTA on 1/3/2020 at 12:40 PM

## 2020-01-07 ENCOUNTER — HOSPITAL ENCOUNTER (OUTPATIENT)
Dept: PHYSICAL THERAPY | Age: 65
Setting detail: THERAPIES SERIES
Discharge: HOME | End: 2020-01-07
Payer: MEDICARE

## 2020-01-07 PROCEDURE — 97110 THERAPEUTIC EXERCISES: CPT

## 2020-01-07 ASSESSMENT — PAIN DESCRIPTION - DESCRIPTORS: DESCRIPTORS: THROBBING;TENDER;SORE;ACHING

## 2020-01-07 ASSESSMENT — PAIN SCALES - GENERAL: PAINLEVEL_OUTOF10: 10

## 2020-01-07 ASSESSMENT — PAIN DESCRIPTION - ORIENTATION: ORIENTATION: RIGHT

## 2020-01-07 ASSESSMENT — PAIN DESCRIPTION - PAIN TYPE: TYPE: CHRONIC PAIN;ACUTE PAIN

## 2020-01-07 NOTE — PROGRESS NOTES
Daily Treatment Note  Date: 2020  Patient Name: Max Fermin  MRN: [id number]     :   1955    Subjective:   General  Additional Pertinent Hx: 75year old female referred to PT with diagnosis of chronic bilateral low back pain without sciatica. Recent x-ray showed mild degenerative disc disease L5-S1. Response To Previous Treatment: Patient with no complaints from previous session. Family / Caregiver Present: Yes(sister)  Referring Practitioner: Andrea Martinez MD  PT Visit Information  PT Insurance Information: [facility] [employer] (pre-cert required)  Total # of Visits Approved: 8  Total # of Visits to Date: 6  Plan of Care/Certification Expiration Date: 20  Progress Note Due Date: 20  Subjective  Subjective: \"Hip is killing me. \"  States she has been very sore/tender and having difficulty walking due to R hip from buttocks around to lateral hip/leg. No known incident of injury. Pain Screening  Patient Currently in Pain: Yes  Pain Assessment  Pain Assessment: 0-10  Pain Level: 10  Pain Type: Chronic pain;Acute pain  Pain Orientation: Right  Pain Descriptors:  Throbbing;Tender;Sore;Aching(new tenderness R posterolateral hip/leg)  Vital Signs  Patient Currently in Pain: Yes       Treatment Activities:           Exercises  Exercise 1: supine lower trunk rotation to left x 5 for 5\" -------R side appeared significantly longer to start so performed appropriate mobs 5\" x 5   x 2 sets  Exercise 2: supine right quadratus stretch to left x 5 for 5\" ea---NOT today  Exercise 3: isometric right hip extension from [facility] position x 5 for 5\"  ( limited ability with hip flex, so performed at avail range)  Exercise 4: axial traction to legs bilaterally x 3 for 10\" (L leg only on  due to pain with attempts to sore R side)  Exercise 5: right single leg bridge x 10---NOT today due to pain  Exercise 6: DKTC x 5 (with towel)---NOT today L only x 5 for 5\"  Exercise 7: left isometric resisted hip flexion at with modified mobs. Pt treatment today modified to avoid stressful/painful positions. Some exercises omitted completely. REQUIRES PT FOLLOW UP: Yes      G-Code:     OutComes Score                                                     Goals:  Short term goals  Time Frame for Short term goals: 3-4 weeks  Short term goal 1: Patient to be independent with HEP. (01/03  HEP issued)  Short term goal 2: Patient to be independent with trial use of SI belt and heel lift in left shoe. Short term goal 3: Patient to report greater ease performing sit to stand movements. Long term goals  Time Frame for Long term goals : 4-6 weeks  Long term goal 1: Patient to walk with less reported episodes of balance loss. Long term goal 2: Patient to have 25 degrees lumbar extension and 30 degrees right sidebending. Long term goal 3: Patient to have >= 4/5 right knee flexion and foot dorsiflexion, 4+/5 right knee extension. Long term goal 4: Patient to score <= 30% impairment on the Oswestry disability questionnaire. Patient Goals   Patient goals : Decreased low back pain.     Plan:    Plan  Times per week: 2x per week  Plan weeks: 6 weels  Current Treatment Recommendations: Strengthening, ROM, Gait Training, Modalities, Positioning, Home Exercise Program, Pain Management, Patient/Caregiver Education & Training, Manual Therapy - Joint Manipulation  Timed Code Treatment Minutes: 34 Minutes     Therapy Time   Individual Concurrent Group Co-treatment   Time In 0117         Time Out 1238         Minutes 34         Timed Code Treatment Minutes: 4592 Julio Rivera PTA       Electronically signed by Enrrique Shay PTA on 1/7/2020 at 12:43 PM

## 2020-01-08 ENCOUNTER — APPOINTMENT (OUTPATIENT)
Dept: PHYSICAL THERAPY | Age: 65
End: 2020-01-08
Payer: MEDICARE

## 2020-01-09 ENCOUNTER — NURSE ONLY (OUTPATIENT)
Dept: PRIMARY CARE CLINIC | Age: 65
End: 2020-01-09
Payer: MEDICARE

## 2020-01-09 LAB
INFLUENZA A ANTIBODY: NORMAL
INFLUENZA B ANTIBODY: NORMAL

## 2020-01-09 PROCEDURE — 87804 INFLUENZA ASSAY W/OPTIC: CPT | Performed by: FAMILY MEDICINE

## 2020-01-09 NOTE — PROGRESS NOTES
Pt presented today for the flu swab pt stated she has been feeling tired but no fever. Flu was negative.

## 2020-01-10 ENCOUNTER — HOSPITAL ENCOUNTER (OUTPATIENT)
Dept: CT IMAGING | Facility: HOSPITAL | Age: 65
Discharge: HOME | End: 2020-01-10
Admitting: INTERNAL MEDICINE

## 2020-01-10 DIAGNOSIS — R91.8 LUNG NODULES: ICD-10-CM

## 2020-01-10 PROCEDURE — 71250 CT THORAX DX C-: CPT

## 2020-01-11 NOTE — PROGRESS NOTES
Let pt know this looked ok.  Nothing else to do for now.  Keep follow up as planned.  We will discuss ongoing follow up scanning then

## 2020-01-13 ENCOUNTER — HOSPITAL ENCOUNTER (OUTPATIENT)
Dept: PHYSICAL THERAPY | Age: 65
Setting detail: THERAPIES SERIES
Discharge: HOME | End: 2020-01-13
Payer: MEDICARE

## 2020-01-13 PROCEDURE — 97110 THERAPEUTIC EXERCISES: CPT

## 2020-01-13 ASSESSMENT — PAIN DESCRIPTION - LOCATION: LOCATION: BACK

## 2020-01-13 ASSESSMENT — PAIN DESCRIPTION - PAIN TYPE: TYPE: CHRONIC PAIN

## 2020-01-13 ASSESSMENT — PAIN SCALES - GENERAL: PAINLEVEL_OUTOF10: 8

## 2020-01-13 ASSESSMENT — PAIN DESCRIPTION - ORIENTATION: ORIENTATION: RIGHT

## 2020-01-13 ASSESSMENT — PAIN DESCRIPTION - DESCRIPTORS: DESCRIPTORS: ACHING;SORE;TENDER

## 2020-01-13 NOTE — PROGRESS NOTES
Physical Therapy  Daily Treatment Note/Re-assessment  Date: 2020  Patient Name: Pasquale Marino  MRN: [id number]     :   1955    Subjective:   General  Chart Reviewed: Yes  Additional Pertinent Hx: 75year old female referred to PT with diagnosis of chronic bilateral low back pain without sciatica. Recent x-ray showed mild degenerative disc disease L5-S1. Response To Previous Treatment: Patient with no complaints from previous session. Referring Practitioner: Myles Reyna MD  PT Visit Information  PT Insurance Information: [facility] (pre-cert required)  Total # of Visits Approved: 8  Total # of Visits to Date: 7  Plan of Care/Certification Expiration Date: 20  Progress Note Due Date: 20  Subjective  Subjective: States that her hip is less painful than at previous visit, but low back painful today. Feels she has \"good days and bad days\" in regards to both balance and pain, but overall feels improvement from therapy. States she feels better when leaving sessions, but carryover is not long-lasting.   Pain Screening  Patient Currently in Pain: Yes  Pain Assessment  Pain Assessment: 0-10  Pain Level: 8  Pain Type: Chronic pain  Pain Location: Back  Pain Orientation: Right  Pain Descriptors: Aching;Sore;Tender  Vital Signs  Patient Currently in Pain: Yes       Treatment Activities:        Exercises  Exercise 1: supine lower trunk rotation to left x 5 for 5\" -------L side appeared significantly longer to start so performed appropriate mobs 5\" x 5   x 2 sets  Exercise 2: supine right quadratus stretch to left x 5 for 5\" ea  Exercise 3: isometric right hip extension from [facility] position x 5 for 5\"  ( limited ability with hip flex, so performed at avail range)- L hip extension today d/t LLE longer  Exercise 4: axial traction to legs bilaterally x 3 for 10\" (L leg only on  due to pain with attempts to sore R side)  Exercise 5: right single leg bridge x 10--- L leg today  Exercise 6: DKTC x 10 (with towel)  Exercise 7: left isometric resisted hip flexion at 90/90 x 5 for 5\"  - right hip flexion today  Exercise 8: pelvic tilts with folded towel x 5 (poor quality, little motion)  Exercise 9: abdominal series with folded towel under sacrum and with pelvic tilt: arms x 5 ea, legs x 5, arms/legs x 5   Exercise 10: sitting forward reach to left shoe x 5 for 5\"  Exercise 11: sitting right hip retraction x 5 for 5\"----- L hip retraction today  Exercise 12: repeated sit to stand, gatito surface to lower x 10  Exercise 13: bilateral LAQ's x 10  Exercise 14: bilateral leg hamstring curls x 15 red  Exercise 15: foot dorsiflexion with t-band (red) x 15  Exercise 16: standing trunk rotation to left with right glute  x 10  Exercise 17: L-stretch with right arm overhead and left out to side x 10   Exercise 18: standing left hip hikes x 5---NOT today  Exercise 19: bilateral hip abduction x 10 ea (seated on 1/7 to L side only)  Exercise 20: bilateral step ups x 10 ea --NOT today--side steps x 10---NOT today---rechecked leg length post ex and =        Assessment:   Conditions Requiring Skilled Therapeutic Intervention  Body structures, Functions, Activity limitations: Decreased ADL status; Decreased ROM; Decreased strength;Decreased functional mobility ; Decreased balance;Decreased high-level IADLs; Increased pain;Decreased posture  Assessment: Re-assessment today. Pt feeling better than at previous visit, with less hip pain, but continued back pain. She does report overall improvement, and feels less pain after sessions, with today's post session rating 3/10 (a 5 pt improvement.)  However, has limited carryover. Will continue to  benefit from skilled PT intervention to address listed impairments.   Treatment Diagnosis: Chronic LBP  Prognosis: Good  REQUIRES PT FOLLOW UP: Yes  Discharge Recommendations: Continue to assess pending progress        Goals:  Short term goals  Time Frame for Short term goals: 3-4 weeks  Short term goal 1: Patient to be independent with HEP. - met(01/03  HEP issued    1/13: Performing HEP consistently)  Short term goal 2: Patient to be independent with trial use of SI belt and heel lift in left shoe. - met(1/13: Still wearing heel lift. Tried SI belt, but felt it didn't help at all, so no longer wearing.)  Short term goal 3: Patient to report greater ease performing sit to stand movements. - not met(1/13: \"Still hurts. It's about the same. \")  Long term goals  Time Frame for Long term goals : 4-6 weeks  Long term goal 1: Patient to walk with less reported episodes of balance loss. - progress(1/13: \"It's a little better. Certain days are worse than others. \")  Long term goal 2: Patient to have 25 degrees lumbar extension and 30 degrees right sidebending.- not met(1/13: 10 degrees lumbar extension, 12 degrees R SB)  Long term goal 3: Patient to have >= 4/5 right knee flexion and foot dorsiflexion, 4+/5 right knee extension.- progress(1/13: Knee flex 3+ to 4-/5, DF 4+/5, knee extension 4 to 4+/5.)  Long term goal 4: Patient to score <= 30% impairment on the Oswestry disability questionnaire.- not met(1/13: 68% impairment)  Patient Goals   Patient goals : Decreased low back pain.     Plan:    Plan  Times per week: 2x per week  Plan weeks: 6 weeks  Current Treatment Recommendations: Strengthening, ROM, Gait Training, Modalities, Positioning, Home Exercise Program, Pain Management, Patient/Caregiver Education & Training, Manual Therapy - Joint Manipulation  Timed Code Treatment Minutes: 47 Minutes     Therapy Time   Individual Concurrent Group Co-treatment   Time In 6810         Time Out 0945         Minutes 47         Timed Code Treatment Minutes: [address], PT

## 2020-01-14 ENCOUNTER — OFFICE VISIT (OUTPATIENT)
Dept: PULMONOLOGY | Facility: CLINIC | Age: 65
End: 2020-01-14

## 2020-01-14 VITALS
BODY MASS INDEX: 21.49 KG/M2 | HEART RATE: 80 BPM | SYSTOLIC BLOOD PRESSURE: 110 MMHG | OXYGEN SATURATION: 98 % | DIASTOLIC BLOOD PRESSURE: 70 MMHG | HEIGHT: 65 IN | WEIGHT: 129 LBS

## 2020-01-14 DIAGNOSIS — G47.34 NOCTURNAL HYPOXEMIA: ICD-10-CM

## 2020-01-14 DIAGNOSIS — R06.02 SHORTNESS OF BREATH: ICD-10-CM

## 2020-01-14 DIAGNOSIS — J44.9 STAGE 2 MODERATE COPD BY GOLD CLASSIFICATION (HCC): ICD-10-CM

## 2020-01-14 DIAGNOSIS — R91.8 LUNG NODULES: Primary | ICD-10-CM

## 2020-01-14 DIAGNOSIS — J43.2 CENTRILOBULAR EMPHYSEMA (HCC): ICD-10-CM

## 2020-01-14 PROCEDURE — 99214 OFFICE O/P EST MOD 30 MIN: CPT | Performed by: INTERNAL MEDICINE

## 2020-01-14 RX ORDER — TIZANIDINE 4 MG/1
TABLET ORAL
COMMUNITY
Start: 2019-11-21

## 2020-01-14 RX ORDER — ESCITALOPRAM OXALATE 20 MG/1
TABLET ORAL
COMMUNITY
Start: 2019-11-21

## 2020-01-14 RX ORDER — ROPINIROLE 0.5 MG/1
TABLET, FILM COATED ORAL
COMMUNITY
Start: 2019-12-26

## 2020-01-14 NOTE — PROGRESS NOTES
Subjective   Georgina Bernard is a 64 y.o. female.     Background: Pt with 70 pk year smoking hx, 13 mm LLL nodule, 4 mm rml nodule.  PET scan 8/2019 indicated 12 mm intrafissural nodule and 3 mm rml nodule, stable 1/2020 need follow up through 6/2021    Chief Complaint   Patient presents with   • Lung Nodule        History of Present Illness   She says she is doing fine with pulmonary issues.  She has djd spine and is in therapy for that.  No shortness of breath, has occasional wheezing after having had a cold in chest for a few days continuously over the past couple of weeks.  No hemoptysis, alleviated by rest and time and inhalers (trelegy) which works great.  She is gaining some weight and almost done with smoking    Medical/Family/Social History   has a past medical history of Arthritis and Hypertension.   has a past surgical history that includes Hysterectomy; Cholecystectomy; Appendectomy; Gallbladder surgery; Hysterectomy; Appendectomy; Tubal ligation; and Carpal tunnel release.  family history includes Cancer in her brother and mother; Heart failure in her father.   reports that she has been smoking. She has a 20.00 pack-year smoking history. She has never used smokeless tobacco. She reports that she does not drink alcohol or use drugs.  Allergies   Allergen Reactions   • Morphine Hives     Medications    Current Outpatient Medications:   •  albuterol sulfate  (90 Base) MCG/ACT inhaler, INHALE 2 PUFFS INTO THE LUNGS EVERY 6HRS AS NEEDED FOR WHEEZING, Disp: , Rfl: 2  •  amLODIPine (NORVASC) 2.5 MG tablet, Take 2.5 mg by mouth Daily., Disp: , Rfl: 0  •  aspirin 81 MG tablet, Take 81 mg by mouth., Disp: , Rfl:   •  Cholecalciferol (VITAMIN D3) 5000 units capsule capsule, Take  by mouth Daily., Disp: , Rfl: 4  •  escitalopram (LEXAPRO) 20 MG tablet, , Disp: , Rfl:   •  FLUoxetine (PROzac) 40 MG capsule, Take 40 mg by mouth Daily., Disp: , Rfl: 1  •  Fluticasone-Umeclidin-Vilant 100-62.5-25 MCG/INH  "aerosol powder , Inhale 1 puff., Disp: , Rfl:   •  gabapentin (NEURONTIN) 300 MG capsule, TAKE 1 CAPSULE BY MOUTH 3 TIMES DAILY  DAYS. INTENDED SUPPLY: 30 DAYS, Disp: , Rfl: 5  •  rOPINIRole (REQUIP) 0.5 MG tablet, , Disp: , Rfl:   •  tiZANidine (ZANAFLEX) 4 MG tablet, , Disp: , Rfl:   •  topiramate (TOPAMAX) 50 MG tablet, Take 50 mg by mouth., Disp: , Rfl:   •  traZODone (DESYREL) 100 MG tablet, Take 100 mg by mouth., Disp: , Rfl:     Review of Systems   Constitutional: Negative for chills and fever.   Cardiovascular: Negative for chest pain.   Gastrointestinal: Negative for nausea and vomiting.     Objective   /70   Pulse 80   Ht 165.1 cm (65\")   Wt 58.5 kg (129 lb)   SpO2 98% Comment: Ra  Breastfeeding No   BMI 21.47 kg/m²   Physical Exam   Constitutional: She appears well-developed. She does not appear ill. No distress.   HENT:   Head: Atraumatic.   Nose: Nose normal.   Eyes: Conjunctivae and EOM are normal. No scleral icterus.   Neck: Neck supple.   Cardiovascular: Normal rate, regular rhythm, S1 normal and S2 normal.   Pulmonary/Chest: Effort normal and breath sounds normal. She has no wheezes. She has no rales.   Abdominal: Soft. She exhibits no distension. There is no tenderness.   Musculoskeletal: She exhibits no deformity.   Neurological: She is alert.   Skin: Skin is warm. No rash noted.   Psychiatric: She has a normal mood and affect.        -----------------------------------------------------------------------------------------------    Ct Chest Without Contrast    Result Date: 1/10/2020  Impression: 1. Stable 12 mm nodule along the inferior aspect left major fissure with a stable noncalcified 5 mm right middle lobe nodule. Only low level FDG uptake seen within the 12 mm left nodule based on PET/CT of 08/14/2019. A follow-up CT chest in 6 months recommended to reestablish longer-term stability. 2. No new or increasing size pulmonary nodules. No consolidation. This report was " finalized on 01/10/2020 12:53 by Dr. Katherine Dos Santos MD.    -----------------------------------------------------------------------------------------------  PFT Values        Some values may be hidden. Unless noted otherwise, only the newest values recorded on each date are displayed.         Old Values PFT Results 9/11/19   FVC 63%   FEV1 51%   FEV1/FVC 64.51   DLCO 59%      Pre Drug PFT Results 9/11/19   No data to display.      Post Drug PFT Results 9/11/19   No data to display.      Other Tests PFT Results 9/11/19   No data to display.               -----------------------------------------------------------------------------------------------  Assessment/Plan   Problem List Items Addressed This Visit        Pulmonary Problems    Lung nodules - Primary    Relevant Orders    CT Chest Without Contrast    Centrilobular emphysema (CMS/[facility])    Overview     AAT MM genotype         Relevant Medications    Fluticasone-Umeclidin-Vilant 100-62.5-25 MCG/INH aerosol powder     Shortness of breath    Nocturnal hypoxemia      Other Visit Diagnoses     Stage 2 moderate COPD by GOLD classification (CMS/[facility])        Relevant Medications    Fluticasone-Umeclidin-Vilant 100-62.5-25 MCG/INH aerosol powder         Patient's Body mass index is 21.47 kg/m². BMI is within normal parameters. No follow-up required..      She is doing great with the trelegy, continue the same  Continue oxygen at night, compliant and benefiting  Follow up ct chest in 6 months, will need to follow out to 6/2021  Continue smoking cessation  Follow up spirometry this summer       Electronically signed by Ba Wray MD, 1/14/2020, 2:53 PM

## 2020-01-16 ENCOUNTER — APPOINTMENT (OUTPATIENT)
Dept: PHYSICAL THERAPY | Age: 65
End: 2020-01-16
Payer: MEDICARE

## 2020-01-16 ENCOUNTER — HOSPITAL ENCOUNTER (OUTPATIENT)
Dept: PHYSICAL THERAPY | Age: 65
Setting detail: THERAPIES SERIES
Discharge: HOME | End: 2020-01-16
Payer: MEDICARE

## 2020-01-16 PROCEDURE — 97110 THERAPEUTIC EXERCISES: CPT

## 2020-01-16 NOTE — PROGRESS NOTES
Physical Therapy  Daily Treatment Note  Date: 2020  Patient Name: Shahana Pierre  MRN: [id number]     :   1955    Subjective:   General  Additional Pertinent Hx: 75year old female referred to PT with diagnosis of chronic bilateral low back pain without sciatica. Recent x-ray showed mild degenerative disc disease L5-S1.   Referring Practitioner: Glo Stein MD  PT Insurance Information: [facility] Medicare (pre-cert required)  Total # of Visits Approved: 12  Total # of Visits to Date: 8  Plan of Care/Certification Expiration Date: 20  Progress Note Due Date: 20  Subjective: i'm not in any pain today, i was able to help a friend paint some wood work yesterday  Patient Currently in Pain: No         Treatment Activities:       Exercises  Exercise 1: supine lower trunk rotation to left x 5 for 5\" -------L side significantly longer to start so performed appropriate mobs 5\" x 5   x 2 sets     : leg length =  Exercise 2: supine right quadratus stretch to left x 5 for 5\" ea  Exercise 3: isometric right hip extension from [facility] position x 5 for 5\"  ( limited ability with hip flex, so performed at avail range)  Exercise 4: axial traction to legs bilaterally x 3 for 10\"   Exercise 5: right single leg bridge x 10--- L leg today  Exercise 6: DKTC x 10 (with towel)  Exercise 7: left isometric resisted hip flexion at 90/90 x 5 for 5\"    Exercise 8: pelvic tilts with folded towel x 10  Exercise 9: abdominal series with folded towel under sacrum and with pelvic tilt: arms x 10 ea, legs x 10, arms/legs x 5   Exercise 10: sitting forward reach to left shoe x 5 for 5\"  Exercise 11: sitting right hip retraction x 5 for 5\"  Exercise 12: repeated sit to stand, gatito surface to lower x 10  Exercise 13: bilateral LAQ's x 15  Exercise 14: bilateral leg hamstring curls x 15 red  Exercise 15: foot dorsiflexion with t-band (red) x 15  Exercise 16: standing trunk rotation to left with right glute  x 10  Exercise 17: L-stretch with right arm overhead and left out to side x 10   Exercise 18: standing left hip hikes x 5---NOT today  Exercise 19: bilateral hip abduction x 10 ea  Exercise 20: bilateral step ups x 10 ea --NOT today--side steps x 10---NOT today---rechecked leg length post ex and RLE was longer than L  (synergistic techniques x 5)         Assessment:   Conditions Requiring Skilled Therapeutic Intervention  Body structures, Functions, Activity limitations: Decreased ADL status; Decreased ROM; Decreased strength;Decreased functional mobility ; Decreased balance;Decreased high-level IADLs; Increased pain;Decreased posture  Assessment: Patient tolerated therapy session very well today, increased reps with abdominal routine, pelvic tilts and LAQ. After exercises RLE was slightly longer than L and after synergistic techniques were performed leg length was = and no pain post treatment. Treatment Diagnosis: Chronic LBP  REQUIRES PT FOLLOW UP: Yes        Goals:  Short term goals  Time Frame for Short term goals: 3-4 weeks  Short term goal 1: Patient to be independent with HEP. - met(01/03  HEP issued    1/13: Performing HEP consistently)  Short term goal 2: Patient to be independent with trial use of SI belt and heel lift in left shoe. - met(1/13: Still wearing heel lift. Tried SI belt, but felt it didn't help at all, so no longer wearing.)  Short term goal 3: Patient to report greater ease performing sit to stand movements. - not met(1/13: \"Still hurts. It's about the same. \")  Long term goals  Time Frame for Long term goals : 4-6 weeks  Long term goal 1: Patient to walk with less reported episodes of balance loss. - progress(1/13: \"It's a little better. Certain days are worse than others. \")  Long term goal 2: Patient to have 25 degrees lumbar extension and 30 degrees right sidebending.- not met(1/13: 10 degrees lumbar extension, 12 degrees R SB)  Long term goal 3: Patient to have >= 4/5 right knee flexion and foot dorsiflexion, 4+/5 right knee extension.- progress(1/13: Knee flex 3+ to 4-/5, DF 4+/5, knee extension 4 to 4+/5.)  Long term goal 4: Patient to score <= 30% impairment on the Oswestry disability questionnaire.- not met(1/13: 68% impairment)  Patient Goals   Patient goals : Decreased low back pain.     Plan:    Times per week: 2x per week  Plan weeks: 6 weeks  Current Treatment Recommendations: Strengthening, ROM, Gait Training, Modalities, Positioning, Home Exercise Program, Pain Management, Patient/Caregiver Education & Training, Manual Therapy - Joint Manipulation        Therapy Time   Individual Concurrent Group Co-treatment   Time In 0343         Time Out 0160         Minutes [address] [place] [address] [facility] [place]    Electronically signed by Henok Kinney PTA on 1/16/2020 at 11:33 AM

## 2020-01-21 ENCOUNTER — APPOINTMENT (OUTPATIENT)
Dept: PHYSICAL THERAPY | Age: 65
End: 2020-01-21
Payer: MEDICARE

## 2020-01-23 ENCOUNTER — APPOINTMENT (OUTPATIENT)
Dept: PHYSICAL THERAPY | Age: 65
End: 2020-01-23
Payer: MEDICARE

## 2020-01-28 ENCOUNTER — HOSPITAL ENCOUNTER (OUTPATIENT)
Dept: PHYSICAL THERAPY | Age: 65
Setting detail: THERAPIES SERIES
Discharge: HOME | End: 2020-01-28
Payer: MEDICARE

## 2020-01-28 PROCEDURE — 97110 THERAPEUTIC EXERCISES: CPT

## 2020-01-28 NOTE — PROGRESS NOTES
Physical Therapy  Daily Treatment Note  Date: 2020  Patient Name: Melina Cody  MRN: [id number]     :   1955    Subjective:   General  Additional Pertinent Hx: 75year old female referred to PT with diagnosis of chronic bilateral low back pain without sciatica. Recent x-ray showed mild degenerative disc disease L5-S1.   Referring Practitioner: Kiara Ramsey MD  PT Insurance Information: [facility] (pre-cert required)  Total # of Visits Approved: 12  Total # of Visits to Date: 9  Plan of Care/Certification Expiration Date: 20  Progress Note Due Date: 20  Subjective: i'm a lot better, was helping hang dry wall yesterday  Patient Currently in Pain: No         Treatment Activities:         Exercises  Exercise 1: supine lower trunk rotation to left x 5 for 5\" -------L side significantly longer to start so performed appropriate mobs 5\" x 5   x 1 set       Exercise 2: supine right quadratus stretch to left x 5 for 5\" ea  Exercise 3: isometric right hip extension from [facility] position x 5 for 5\"    Exercise 4: axial traction to legs bilaterally x 3 for 10\"   Exercise 5: right single leg bridge x 10--- L leg today  Exercise 6: DKTC 5\" x 10 (with towel)  Exercise 7: left isometric resisted hip flexion at 90/90 x 5 for 5\"   R today  Exercise 8: pelvic tilts with folded towel x 10  Exercise 9: abdominal series with folded towel under sacrum and with pelvic tilt: arms x 10 ea, legs x 10, arms/legs x 10  Exercise 10: sitting forward reach to left shoe x 5 for 5\"  Exercise 11: sitting right hip retraction x 5 for 5\"  Exercise 12: repeated sit to stand, gatito surface to lower x 10  Exercise 13: bilateral LAQ's x 15  Exercise 14: bilateral leg hamstring curls x 15 red  Exercise 15: foot dorsiflexion with t-band (red) x 15  Exercise 16: standing trunk rotation to left with right glute  x 10  Exercise 17: L-stretch with right arm overhead and left out to side x 10   Exercise 18: standing left hip hikes x 5---NOT today  Exercise 19: bilateral hip abduction x 10 ea  Exercise 20: bilateral step ups x 10 ea ---side steps x 10----rechecked leg length post ex and RLE was longer than L  (synergistic techniques x 5)         Assessment:   Conditions Requiring Skilled Therapeutic Intervention  Body structures, Functions, Activity limitations: Decreased ADL status; Decreased ROM; Decreased strength;Decreased functional mobility ; Decreased balance;Decreased high-level IADLs; Increased pain;Decreased posture  Assessment: Patient did well today and goals assessed due to she may cancell her visit one week from now if still doing well. All goals met with exception on LTG #2 which was partiaally met. Treatment Diagnosis: Chronic LBP             Goals:  Short term goals  Time Frame for Short term goals: 3-4 weeks  Short term goal 1: Patient to be independent with HEP. - met(01/03  HEP issued    1/13: Performing HEP consistently)  Short term goal 2: Patient to be independent with trial use of SI belt and heel lift in left shoe. - met(1/13: Still wearing heel lift. Tried SI belt, but felt it didn't help at all, so no longer wearing.)  Short term goal 3: Patient to report greater ease performing sit to stand movements. -- met(1/13: \"Still hurts. It's about the same. \"  1/28: no pain or difficulty)  Long term goals  Time Frame for Long term goals : 4-6 weeks  Long term goal 1: Patient to walk with less reported episodes of balance loss. --MET(1/13: \"It's a little better. Certain days are worse than others. \" 1/28: reports no episoces of balance loss)  Long term goal 2: Patient to have 25 degrees lumbar extension and 30 degrees right sidebending.- partially met(1/13: 10 degrees lumbar extension, 12 degrees R SB   1/28: 25* extension & 22* R sidebend)  Long term goal 3: Patient to have >= 4/5 right knee flexion and foot dorsiflexion, 4+/5 right knee extension. --MET(1/13: Knee flex 3+ to 4-/5, DF 4+/5, knee extension 4 to 4+/5.  1/28: R

## 2020-01-30 ENCOUNTER — APPOINTMENT (OUTPATIENT)
Dept: PHYSICAL THERAPY | Age: 65
End: 2020-01-30
Payer: MEDICARE

## 2020-03-23 RX ORDER — GABAPENTIN 300 MG/1
300 CAPSULE ORAL 3 TIMES DAILY
Qty: 90 CAPSULE | Refills: 1 | Status: SHIPPED | OUTPATIENT
Start: 2020-03-23 | End: 2020-04-20 | Stop reason: SDUPTHER

## 2020-04-07 ENCOUNTER — NURSE TRIAGE (OUTPATIENT)
Dept: OTHER | Facility: CLINIC | Age: 65
End: 2020-04-07

## 2020-04-13 ENCOUNTER — VIRTUAL VISIT (OUTPATIENT)
Dept: PRIMARY CARE CLINIC | Age: 65
End: 2020-04-13
Payer: MEDICARE

## 2020-04-13 VITALS — WEIGHT: 135 LBS | BODY MASS INDEX: 22.49 KG/M2 | HEIGHT: 65 IN

## 2020-04-13 PROCEDURE — 99214 OFFICE O/P EST MOD 30 MIN: CPT | Performed by: FAMILY MEDICINE

## 2020-04-13 RX ORDER — MONTELUKAST SODIUM 4 MG/1
1 TABLET, CHEWABLE ORAL 2 TIMES DAILY
Qty: 60 TABLET | Refills: 3 | Status: SHIPPED | OUTPATIENT
Start: 2020-04-13 | End: 2020-07-06

## 2020-04-13 ASSESSMENT — ENCOUNTER SYMPTOMS
COLOR CHANGE: 0
WHEEZING: 0
VOMITING: 0
BACK PAIN: 0
EYE DISCHARGE: 0
NAUSEA: 0
DIARRHEA: 1
COUGH: 0
ABDOMINAL PAIN: 0

## 2020-04-13 NOTE — PROGRESS NOTES
[address], [address]  Phone:  [phone]      2020     TELEHEALTH EVALUATION -- Audio/Visual (During KZUUI-04 public health emergency)    HPI:    Lasha Cao (:  1955) has requested an audio/video evaluation for the following concern(s):    Patient was seen today via video visit via doxy. She was seen for complaints of diarrhea. She has been having diarrhea for over a year. She states that she gets about 6-7 times per day. She states that it does not happen every day but at least 2 to 3 days/week. She states that this is been going on for over a year but does seem to be getting worse in the last couple months. She denies any changes in medications. She states that she does not have a gallbladder she had this removed several years ago. She states that she has not noticed any blood in her stool. She denies any family history of colon cancer. She denies any weight loss. She states her appetite is been normal.  She denies any nausea or vomiting. She does have a history of chronic back pain. She has done physical therapy. She states that it did help initially but she states that her back still bothers her a lot. She is taking the gabapentin which she states does help with the back pain with sciatica. She states that she takes it 3 times a day. She states that she tolerates it well denies any side effects to the medication. She is still seeing Dr. Lars Anguiano for her COPD. She states that he has not changed any of her medications recently. She is on Trelegy inhaler. She states that she has not had any flareups recently. She denies any fever or productive cough. Review of Systems   Constitutional: Negative for activity change, appetite change and fever. HENT: Negative for congestion and nosebleeds. Eyes: Negative for discharge. Respiratory: Negative for cough and wheezing. Cardiovascular: Negative for chest pain and leg swelling. Reactions    Morphine And Related    ,   Past Medical History:   Diagnosis Date    Anxiety     COPD (chronic obstructive pulmonary disease) (HCC)     Depression     GERD (gastroesophageal reflux disease)     Hypertension     Neuropathy     Restless legs syndrome    ,   Past Surgical History:   Procedure Laterality Date    APPENDECTOMY      CHOLECYSTECTOMY      HYSTERECTOMY, TOTAL ABDOMINAL     ,   Social History     Tobacco Use    Smoking status: Current Some Day Smoker    Smokeless tobacco: Never Used   Substance Use Topics    Alcohol use: Not on file    Drug use: Not on file   ,   Family History   Problem Relation Age of Onset    High Blood Pressure Mother     Cancer Mother     Heart Attack Father     Cancer Brother        PHYSICAL EXAMINATION:  Physical Exam  Constitutional:       General: She is not in acute distress. Appearance: Normal appearance. She is not ill-appearing. HENT:      Head: Normocephalic and atraumatic. Nose: Nose normal.   Eyes:      Extraocular Movements: Extraocular movements intact. Conjunctiva/sclera: Conjunctivae normal.   Neck:      Musculoskeletal: Normal range of motion. Skin:     Findings: No rash. Neurological:      General: No focal deficit present. Mental Status: She is alert and oriented to person, place, and time. Mental status is at baseline. Cranial Nerves: No cranial nerve deficit. Psychiatric:         Attention and Perception: Attention normal.         Mood and Affect: Mood and affect normal.         Speech: Speech normal.         Behavior: Behavior normal. Behavior is cooperative. Thought Content: Thought content normal.         Cognition and Memory: Cognition and memory normal.         Judgment: Judgment normal.         Due to this being a TeleHealth encounter, evaluation of the following organ systems is limited: Vitals/Constitutional/EENT/Resp/CV/GI//MS/Neuro/Skin/Heme-Lymph-Imm. ASSESSMENT/PLAN:  1.  Diarrhea, unspecified type  Referral to GI for workup of chronic diarrhea. - colestipol (COLESTID) 1 g tablet; Take 1 tablet by mouth 2 times daily  Dispense: 60 tablet; Refill: 3  - Annemarie Dorman MD, Gastroenterology, [facility]    2. Stage 2 moderate COPD by GOLD classification ([facility]  Continue current meds. Continue to follow with Dr. Mitul Argueta. 3. Chronic bilateral low back pain with bilateral sciatica  Continue gabapentin. Return in about 3 months (around 7/13/2020), or if symptoms worsen or fail to improve, for follow up. An  electronic signature was used to authenticate this note. --Valarie Sanchez MD on 4/13/2020 at 1:09 PM        Pursuant to the emergency declaration under the [facility], 1135 waiver authority and the [employer] and [employer] Act, this Virtual  Visit was conducted, with patient's consent, to reduce the patient's risk of exposure to COVID-19 and provide continuity of care for an established patient. Services were provided through a video synchronous discussion virtually to substitute for in-person clinic visit.

## 2020-04-20 ENCOUNTER — VIRTUAL VISIT (OUTPATIENT)
Dept: GASTROENTEROLOGY | Age: 65
End: 2020-04-20
Payer: MEDICARE

## 2020-04-20 PROCEDURE — 99205 OFFICE O/P NEW HI 60 MIN: CPT | Performed by: NURSE PRACTITIONER

## 2020-04-20 RX ORDER — GABAPENTIN 300 MG/1
300 CAPSULE ORAL 3 TIMES DAILY
Qty: 90 CAPSULE | Refills: 1 | Status: SHIPPED | OUTPATIENT
Start: 2020-04-20 | End: 2020-09-04 | Stop reason: SDUPTHER

## 2020-04-20 ASSESSMENT — ENCOUNTER SYMPTOMS
CONSTIPATION: 0
TROUBLE SWALLOWING: 0
COUGH: 0
ABDOMINAL DISTENTION: 0
NAUSEA: 0
SHORTNESS OF BREATH: 0
ANAL BLEEDING: 0
VOMITING: 0
BLOOD IN STOOL: 0
CHOKING: 0
ABDOMINAL PAIN: 0
RECTAL PAIN: 0
DIARRHEA: 1

## 2020-04-20 NOTE — PROGRESS NOTES
2020    TELEHEALTH EVALUATION -- Audio/Visual (During XYRIJ-59 public health emergency)    HPI:    Harriett Cui (:  1955) has requested an audio/video evaluation for the following concern(s):  Chief Complaint   Patient presents with    Diarrhea       Diarrhea  Patient complains of diarrhea. Onset of diarrhea was years ago, that has waxed and waned. Diarrhea is occurring approximately 3-4 times per day. Patient describes diarrhea as watery. Diarrhea has been associated with fecal incontinence, fecal urgency and nocturnal diarrhea. Patient denies blood in stool and abdominal pain. This is aggravated with foods. She reports days without Bm and some days with formed stool. She recently started Colestid and this has improved her symptoms. Last Colonoscopy 3 years ago - 2 polyps per patient in [facility]  Denies any family history of colon cancer or colon polyps    She is currently taking ASA 81 mg po daily  She uses O2 at night for hx COPD    Pt has increased risk factors for sedation as listed above. This was my first time assessing Ms. Samaniego    Review of Systems   Constitutional: Negative for activity change, appetite change, fatigue, fever and unexpected weight change. HENT: Negative for trouble swallowing. Respiratory: Negative for cough, choking and shortness of breath. Cardiovascular: Negative for chest pain. Gastrointestinal: Positive for diarrhea. Negative for abdominal distention, abdominal pain, anal bleeding, blood in stool, constipation, nausea, rectal pain and vomiting. Allergic/Immunologic: Negative for food allergies. All other systems reviewed and are negative. Prior to Visit Medications    Medication Sig Taking? Authorizing Provider   gabapentin (NEURONTIN) 300 MG capsule Take 1 capsule by mouth 3 times daily for 180 days.  Intended supply: 30 days  Lucien Triana MD   colestipol (COLESTID) 1 g tablet Take 1 tablet by mouth 2 times daily  Angelic MORGAN MD Luciano   Cholecalciferol (VITAMIN D3) 125 MCG (5000 UT) CAPS TAKE 1 TABLET BY MOUTH EVERY DAY  Angelic Wolf MD   escitalopram (LEXAPRO) 20 MG tablet Take 1 tablet by mouth daily  Di Serrano MD   traZODone (DESYREL) 150 MG tablet Take 1 tablet by mouth nightly  Di Serrano MD   tiZANidine (ZANAFLEX) 4 MG tablet Take 1 tablet by mouth 3 times daily  Angelic Wolf MD   CHANTIX 0.5 MG tablet TAKE 1 TAB DOBSON X 3 DAYS, 1 TAB TWICE DAILY X 4 DAYS, THEN 2 TABS TWICE DAILY  Di Serrano MD   fluticasone-umeclidin-vilant (TRELEGY ELLIPTA) 100-62.5-25 MCG/INH AEPB Inhale 1 puff into the lungs daily  Historical Provider, MD   aspirin 81 MG tablet Take 81 mg by mouth daily  Historical Provider, MD   topiramate (TOPAMAX) 50 MG tablet Take 50 mg by mouth 2 times daily  Historical Provider, MD   amLODIPine (NORVASC) 2.5 MG tablet Take 2.5 mg by mouth daily  Historical Provider, MD       Social History     Tobacco Use    Smoking status: Current Some Day Smoker    Smokeless tobacco: Never Used   Substance Use Topics    Alcohol use: Not on file    Drug use: Not on file        Allergies   Allergen Reactions    Morphine And Related    ,   Past Medical History:   Diagnosis Date    Anxiety     [facility])     Depression     GERD (gastroesophageal reflux disease)     Hypertension     Neuropathy     Restless legs syndrome    ,   Family History   Problem Relation Age of Onset    High Blood Pressure Mother     Cancer Mother     Heart Attack Father     Cancer Brother     Colon Cancer Neg Hx        PHYSICAL EXAMINATION:  [ INSTRUCTIONS:  \"[x]\" Indicates a positive item  \"[]\" Indicates a negative item  -- DELETE ALL ITEMS NOT EXAMINED]  Vital Signs: (As obtained by patient/caregiver or practitioner observation)    Blood pressure-  Heart rate-    Respiratory rate-    Temperature-  Pulse oximetry- need for bowel prep (if ordered) prior to their Endoscopic procedure. They are aware they must have someone accompany them to their scheduled procedure to drive them home - they agree to the above and are willing to continue. Return for after procedures. or sooner if needed    Archana Rosado is a 75 y.o. female being evaluated by a Virtual Visit (video visit) encounter to address concerns as mentioned above. A caregiver was present when appropriate. Due to this being a TeleHealth encounter (During [facility]), evaluation of the following organ systems was limited: Vitals/Constitutional/EENT/Resp/CV/GI//MS/Neuro/Skin/Heme-Lymph-Imm. Pursuant to the emergency declaration under the [address] [place] [address], [address] [place] [address] authority and the [employer] and [employer] Act, this Virtual Visit was conducted with patient's (and/or legal guardian's) consent, to reduce the patient's risk of exposure to COVID-19 and provide necessary medical care. The patient (and/or legal guardian) has also been advised to contact this office for worsening conditions or problems, and seek emergency medical treatment and/or call 911 if deemed necessary. Services were provided through a video synchronous discussion virtually to substitute for in-person clinic visit. Patient and provider were located at their individual homes. --JENNIFER Meraz NP on 4/20/2020 at 11:11 AM    An electronic signature was used to authenticate this note.

## 2020-04-21 RX ORDER — AMLODIPINE BESYLATE 2.5 MG/1
TABLET ORAL
Qty: 90 TABLET | Refills: 1 | Status: SHIPPED | OUTPATIENT
Start: 2020-04-21 | End: 2020-07-13 | Stop reason: SDUPTHER

## 2020-04-27 RX ORDER — TRAZODONE HYDROCHLORIDE 150 MG/1
TABLET ORAL
Qty: 90 TABLET | Refills: 3 | Status: SHIPPED | OUTPATIENT
Start: 2020-04-27 | End: 2020-12-21

## 2020-04-27 RX ORDER — ESCITALOPRAM OXALATE 20 MG/1
TABLET ORAL
Qty: 90 TABLET | Refills: 3 | Status: SHIPPED | OUTPATIENT
Start: 2020-04-27 | End: 2020-07-13 | Stop reason: ALTCHOICE

## 2020-06-02 ENCOUNTER — OFFICE VISIT (OUTPATIENT)
Age: 65
End: 2020-06-02
Payer: MEDICARE

## 2020-06-02 ENCOUNTER — NURSE TRIAGE (OUTPATIENT)
Dept: OTHER | Facility: CLINIC | Age: 65
End: 2020-06-02

## 2020-06-02 VITALS
WEIGHT: 135 LBS | BODY MASS INDEX: 22.49 KG/M2 | DIASTOLIC BLOOD PRESSURE: 72 MMHG | HEART RATE: 111 BPM | OXYGEN SATURATION: 95 % | TEMPERATURE: 98.8 F | HEIGHT: 65 IN | SYSTOLIC BLOOD PRESSURE: 118 MMHG

## 2020-06-02 PROCEDURE — 99214 OFFICE O/P EST MOD 30 MIN: CPT | Performed by: NURSE PRACTITIONER

## 2020-06-02 RX ORDER — AZITHROMYCIN 250 MG/1
TABLET, FILM COATED ORAL
Qty: 1 PACKET | Refills: 0 | Status: SHIPPED | OUTPATIENT
Start: 2020-06-02 | End: 2020-06-12

## 2020-06-02 RX ORDER — ONDANSETRON 4 MG/1
4 TABLET, ORALLY DISINTEGRATING ORAL EVERY 8 HOURS PRN
Qty: 15 TABLET | Refills: 0 | Status: SHIPPED | OUTPATIENT
Start: 2020-06-02

## 2020-06-02 RX ORDER — PREDNISONE 20 MG/1
40 TABLET ORAL DAILY
Qty: 10 TABLET | Refills: 0 | Status: SHIPPED | OUTPATIENT
Start: 2020-06-02 | End: 2020-06-07

## 2020-06-02 ASSESSMENT — ENCOUNTER SYMPTOMS
NAUSEA: 1
SHORTNESS OF BREATH: 1
VOMITING: 1
SORE THROAT: 0
ABDOMINAL PAIN: 0
DIARRHEA: 0
RHINORRHEA: 0
COUGH: 1

## 2020-06-02 NOTE — PROGRESS NOTES
file.       Patient given educational materials - see patient instructions. Discussed use, benefit, and side effects of prescribed medications. All patient questions answered. Pt voiced understanding. Patient agreed with treatment plan.  Follow up as needed      Electronically signed by JENNIFER aMtson on 6/2/2020 at 1:15 PM

## 2020-06-02 NOTE — PATIENT INSTRUCTIONS
not have an account, please click on the \"Sign Up Now\" link. Current as of: February 24, 2020               Content Version: 12.5  © 2006-2020 [employer]. Care instructions adapted under license by [facility] ([facility]). If you have questions about a medical condition or this instruction, always ask your healthcare professional. [address] any warranty or liability for your use of this information. Patient Education        Learning About Coronavirus [phone])  Coronavirus [phone]): Overview  What is coronavirus (AJNKY-64)? The coronavirus disease (COVID-19) is caused by a virus. It is an illness that was first found in [place], [place], in December 2019. It has since spread worldwide. The virus can cause fever, cough, and trouble breathing. In severe cases, it can cause pneumonia and make it hard to breathe without help. It can cause death. Coronaviruses are a large group of viruses. They cause the common cold. They also cause more serious illnesses like Middle East respiratory syndrome (MERS) and severe acute respiratory syndrome (SARS). COVID-19 is caused by a novel coronavirus. That means it's a new type that has not been seen in people before. This virus spreads person-to-person through droplets from coughing and sneezing. It can also spread when you are close to someone who is infected. And it can spread when you touch something that has the virus on it, such as a doorknob or a tabletop. What can you do to protect yourself from coronavirus (COVID-19)? The best way to protect yourself from getting sick is to:  · Avoid areas where there is an outbreak. · Avoid contact with people who may be infected. · Wash your hands often with soap or alcohol-based hand sanitizers. · Avoid crowds and try to stay at least 6 feet away from other people. · Wash your hands often, especially after you cough or sneeze. Use soap and water, and scrub for at least 20 seconds.  If soap and latest information? The following health organizations are tracking and studying this virus. Their websites contain the most up-to-date information. Shawna Carlos also learn what to do if you think you may have been exposed to the virus. · U.S. Centers for Disease Control and Prevention (CDC): The CDC provides updated news about the disease and travel advice. The website also tells you how to prevent the spread of infection. www.cdc.gov  · World Health Organization [facility]): WHO offers information about the virus outbreaks. WHO also has travel advice. www.who.int  Current as of: May 8, 2020               Content Version: 12.5  © 2006-2020 [employer]. Care instructions adapted under license by [facility] ([facility]). If you have questions about a medical condition or this instruction, always ask your healthcare professional. [address] any warranty or liability for your use of this information. 1. Take zithromax for full course  2. Prednisone as prescribed  3. Continue inhaler as prescribed   4. Monitor fever and treat as needed with Tylenol/Motrin  5. Stay home and self quarantine until we call with results of coronavirus test  6. If patient is not improving or developing any new/worsening symptoms then return to clinic as needed or go to ER. Patient is to follow up with PCP as needed.

## 2020-06-04 LAB
REPORT: NORMAL
SARS-COV-2: NOT DETECTED
THIS TEST SENT TO: NORMAL

## 2020-06-25 ENCOUNTER — VIRTUAL VISIT (OUTPATIENT)
Dept: PRIMARY CARE CLINIC | Age: 65
End: 2020-06-25
Payer: MEDICARE

## 2020-06-25 PROCEDURE — 99213 OFFICE O/P EST LOW 20 MIN: CPT | Performed by: NURSE PRACTITIONER

## 2020-06-25 RX ORDER — TIZANIDINE 4 MG/1
4 TABLET ORAL 3 TIMES DAILY
Qty: 30 TABLET | Refills: 0 | Status: SHIPPED | OUTPATIENT
Start: 2020-06-25 | End: 2020-10-12 | Stop reason: SDUPTHER

## 2020-06-25 RX ORDER — PREDNISONE 10 MG/1
TABLET ORAL
Qty: 18 TABLET | Refills: 0 | Status: SHIPPED | OUTPATIENT
Start: 2020-06-25 | End: 2020-07-13

## 2020-06-25 ASSESSMENT — ENCOUNTER SYMPTOMS: BACK PAIN: 1

## 2020-06-25 NOTE — PROGRESS NOTES
Breast cancer screen  12/06/2003    Shingles Vaccine (1 of 2) 12/06/2003    DEXA (modify frequency per FRAX score)  12/06/2008    Colon cancer screen colonoscopy  01/19/2017    Pneumococcal 65+ years Vaccine (2 of 2 - PPSV23) 07/16/2020    Annual Wellness Visit (AWV)  11/21/2020    Flu vaccine  Completed    Hepatitis A vaccine  Aged Out    Hepatitis B vaccine  Aged Out    Hib vaccine  Aged Out    Meningococcal (ACWY) vaccine  Aged Out       PHYSICAL EXAMINATION:  Physical Exam  Constitutional:       Appearance: Normal appearance. She is well-developed. HENT:      Head: Normocephalic. Eyes:      Conjunctiva/sclera: Conjunctivae normal.      Pupils: Pupils are equal, round, and reactive to light. Neck:      Musculoskeletal: Normal range of motion. Pulmonary:      Effort: Pulmonary effort is normal.   Skin:     General: Skin is warm and dry. Neurological:      General: No focal deficit present. Mental Status: She is alert and oriented to person, place, and time. Psychiatric:         Mood and Affect: Mood normal.         Behavior: Behavior normal.         Thought Content: Thought content normal.         Judgment: Judgment normal.         Due to this being a TeleHealth encounter, evaluation of the following organ systems is limited: Vitals/Constitutional/EENT/Resp/CV/GI//MS/Neuro/Skin/Heme-Lymph-Imm. ASSESSMENT/PLAN:  1. Chronic bilateral low back pain with bilateral sciatica  We will start her on a steroid pack and add the muscle relaxer Zanaflex back. She has the exercises from her previous therapy from 2019. If she is not better by Monday she will call and we will get an x-ray and send her back to physical therapy. No follow-ups on file. An  electronic signature was used to authenticate this note.     --JENNIFER Richter - CNP on 6/25/2020 at 3:15 PM        Pursuant to the emergency declaration under the [facility], 1135 waiver authority and the Coronavirus Preparedness and Response Supplemental Appropriations Act, this Virtual  Visit was conducted, with patient's consent, to reduce the patient's risk of exposure to COVID-19 and provide continuity of care for an established patient. Services were provided through a video synchronous discussion virtually to substitute for in-person clinic visit.

## 2020-07-06 RX ORDER — MONTELUKAST SODIUM 4 MG/1
TABLET, CHEWABLE ORAL
Qty: 180 TABLET | Refills: 1 | Status: SHIPPED | OUTPATIENT
Start: 2020-07-06 | End: 2021-01-04

## 2020-07-06 RX ORDER — TOPIRAMATE 50 MG/1
50 TABLET, FILM COATED ORAL 2 TIMES DAILY
Qty: 60 TABLET | Refills: 1 | Status: SHIPPED | OUTPATIENT
Start: 2020-07-06 | End: 2020-07-31

## 2020-07-07 RX ORDER — ROPINIROLE 0.5 MG/1
0.5 TABLET, FILM COATED ORAL 2 TIMES DAILY
Qty: 60 TABLET | Refills: 1 | Status: SHIPPED | OUTPATIENT
Start: 2020-07-07 | End: 2020-08-03

## 2020-07-13 ENCOUNTER — OFFICE VISIT (OUTPATIENT)
Dept: PRIMARY CARE CLINIC | Age: 65
End: 2020-07-13
Payer: MEDICARE

## 2020-07-13 ENCOUNTER — TELEPHONE (OUTPATIENT)
Dept: PRIMARY CARE CLINIC | Age: 65
End: 2020-07-13

## 2020-07-13 VITALS
TEMPERATURE: 97.1 F | HEART RATE: 75 BPM | RESPIRATION RATE: 16 BRPM | WEIGHT: 134.4 LBS | DIASTOLIC BLOOD PRESSURE: 78 MMHG | BODY MASS INDEX: 22.39 KG/M2 | SYSTOLIC BLOOD PRESSURE: 148 MMHG | HEIGHT: 65 IN

## 2020-07-13 LAB
ALBUMIN SERPL-MCNC: 3.9 G/DL (ref 3.5–5.2)
ALP BLD-CCNC: 105 U/L (ref 35–104)
ALT SERPL-CCNC: 9 U/L (ref 5–33)
ANION GAP SERPL CALCULATED.3IONS-SCNC: 11 MMOL/L (ref 7–19)
AST SERPL-CCNC: 13 U/L (ref 5–32)
BASOPHILS ABSOLUTE: 0.1 K/UL (ref 0–0.2)
BASOPHILS RELATIVE PERCENT: 1.1 % (ref 0–1)
BILIRUB SERPL-MCNC: <0.2 MG/DL (ref 0.2–1.2)
BUN BLDV-MCNC: 11 MG/DL (ref 8–23)
CALCIUM SERPL-MCNC: 8.9 MG/DL (ref 8.8–10.2)
CHLORIDE BLD-SCNC: 109 MMOL/L (ref 98–111)
CHOLESTEROL, TOTAL: 189 MG/DL (ref 160–199)
CO2: 23 MMOL/L (ref 22–29)
CREAT SERPL-MCNC: 0.9 MG/DL (ref 0.5–0.9)
EOSINOPHILS ABSOLUTE: 0.2 K/UL (ref 0–0.6)
EOSINOPHILS RELATIVE PERCENT: 2.1 % (ref 0–5)
GFR NON-AFRICAN AMERICAN: >60
GLUCOSE BLD-MCNC: 103 MG/DL (ref 74–109)
HCT VFR BLD CALC: 44.8 % (ref 37–47)
HDLC SERPL-MCNC: 64 MG/DL (ref 65–121)
HEMOGLOBIN: 14.1 G/DL (ref 12–16)
IMMATURE GRANULOCYTES #: 0 K/UL
LDL CHOLESTEROL CALCULATED: 105 MG/DL
LYMPHOCYTES ABSOLUTE: 1.7 K/UL (ref 1.1–4.5)
LYMPHOCYTES RELATIVE PERCENT: 21.1 % (ref 20–40)
MCH RBC QN AUTO: 30.8 PG (ref 27–31)
MCHC RBC AUTO-ENTMCNC: 31.5 G/DL (ref 33–37)
MCV RBC AUTO: 97.8 FL (ref 81–99)
MONOCYTES ABSOLUTE: 0.5 K/UL (ref 0–0.9)
MONOCYTES RELATIVE PERCENT: 5.9 % (ref 0–10)
NEUTROPHILS ABSOLUTE: 5.7 K/UL (ref 1.5–7.5)
NEUTROPHILS RELATIVE PERCENT: 69.6 % (ref 50–65)
PDW BLD-RTO: 13.7 % (ref 11.5–14.5)
PLATELET # BLD: 235 K/UL (ref 130–400)
PMV BLD AUTO: 10.9 FL (ref 9.4–12.3)
POTASSIUM SERPL-SCNC: 4.4 MMOL/L (ref 3.5–5)
RBC # BLD: 4.58 M/UL (ref 4.2–5.4)
SODIUM BLD-SCNC: 143 MMOL/L (ref 136–145)
TOTAL PROTEIN: 7 G/DL (ref 6.6–8.7)
TRIGL SERPL-MCNC: 101 MG/DL (ref 0–149)
WBC # BLD: 8.2 K/UL (ref 4.8–10.8)

## 2020-07-13 PROCEDURE — 36415 COLL VENOUS BLD VENIPUNCTURE: CPT | Performed by: FAMILY MEDICINE

## 2020-07-13 PROCEDURE — G8431 POS CLIN DEPRES SCRN F/U DOC: HCPCS | Performed by: FAMILY MEDICINE

## 2020-07-13 PROCEDURE — 99214 OFFICE O/P EST MOD 30 MIN: CPT | Performed by: FAMILY MEDICINE

## 2020-07-13 PROCEDURE — G0444 DEPRESSION SCREEN ANNUAL: HCPCS | Performed by: FAMILY MEDICINE

## 2020-07-13 RX ORDER — AMLODIPINE BESYLATE 5 MG/1
5 TABLET ORAL DAILY
Qty: 30 TABLET | Refills: 5 | Status: SHIPPED | OUTPATIENT
Start: 2020-07-13 | End: 2020-10-14 | Stop reason: SDUPTHER

## 2020-07-13 ASSESSMENT — PATIENT HEALTH QUESTIONNAIRE - PHQ9
SUM OF ALL RESPONSES TO PHQ9 QUESTIONS 1 & 2: 6
10. IF YOU CHECKED OFF ANY PROBLEMS, HOW DIFFICULT HAVE THESE PROBLEMS MADE IT FOR YOU TO DO YOUR WORK, TAKE CARE OF THINGS AT HOME, OR GET ALONG WITH OTHER PEOPLE: 2
1. LITTLE INTEREST OR PLEASURE IN DOING THINGS: 3
SUM OF ALL RESPONSES TO PHQ QUESTIONS 1-9: 23
8. MOVING OR SPEAKING SO SLOWLY THAT OTHER PEOPLE COULD HAVE NOTICED. OR THE OPPOSITE, BEING SO FIGETY OR RESTLESS THAT YOU HAVE BEEN MOVING AROUND A LOT MORE THAN USUAL: 3
5. POOR APPETITE OR OVEREATING: 3
9. THOUGHTS THAT YOU WOULD BE BETTER OFF DEAD, OR OF HURTING YOURSELF: 0
6. FEELING BAD ABOUT YOURSELF - OR THAT YOU ARE A FAILURE OR HAVE LET YOURSELF OR YOUR FAMILY DOWN: 3
4. FEELING TIRED OR HAVING LITTLE ENERGY: 3
7. TROUBLE CONCENTRATING ON THINGS, SUCH AS READING THE NEWSPAPER OR WATCHING TELEVISION: 3
SUM OF ALL RESPONSES TO PHQ QUESTIONS 1-9: 23
3. TROUBLE FALLING OR STAYING ASLEEP: 2
2. FEELING DOWN, DEPRESSED OR HOPELESS: 3

## 2020-07-13 NOTE — PROGRESS NOTES
SUBJECTIVE:    Patient ID: Kristie Phillips is a 75 y.o. female. HPI:   Patient presents today for a 3 month follow up. She does have a history of anxiety. She does not feel like her Lexapro is working well for her anxiety. She feels like she would like to try something different because she does not feel like it is helping and she is having some problems with depression as well. She does have history of hypertension. She is currently on amlodipine. She states that she is tolerating this well. Her blood pressure is a little elevated in office today. She does have problems with insomnia. She is on trazodone. She states that she is currently tolerating this well. She states that it does help with her insomnia. She also has a history of COPD. She is on several inhalers. She states that she is using these regularly. She is not coughing up anything. She denies any fevers or chills. She does have problems with chronic back pain. She states that this is been going on for several months to even years. She states that it is getting worse. She is having some pain that radiates down both legs. She has done physical therapy in the past.  She is also tried muscle relaxers and anti-inflammatories and has not had any relief of her symptoms.       Past Medical History:   Diagnosis Date    Anxiety     [facility]ch[facility] disease) ([facility])     Depression     GERD (gastroesophageal reflux disease)     Hypertension     Neuropathy     Restless legs syndrome       Current Outpatient Medications   Medication Sig Dispense Refill    sertraline (ZOLOFT) 50 MG tablet Take 1 tablet by mouth daily 30 tablet 5    amLODIPine (NORVASC) 5 MG tablet Take 1 tablet by mouth daily 30 tablet 5    rOPINIRole (REQUIP) 0.5 MG tablet Take 1 tablet by mouth 2 times daily 60 tablet 1    colestipol (COLESTID) 1 g tablet TAKE 1 TABLET BY MOUTH TWICE A  tablet 1    topiramate (TOPAMAX) 50 MG tablet Take 1 tablet by mouth 2 times daily 60 tablet 1    ondansetron (ZOFRAN ODT) 4 MG disintegrating tablet Take 1 tablet by mouth every 8 hours as needed for Nausea or Vomiting 15 tablet 0    traZODone (DESYREL) 150 MG tablet TAKE 1 TABLET BY MOUTH EVERY DAY AT NIGHT 90 tablet 3    gabapentin (NEURONTIN) 300 MG capsule Take 1 capsule by mouth 3 times daily for 180 days. Intended supply: 30 days 90 capsule 1    Cholecalciferol (VITAMIN D3) 125 MCG (5000 UT) CAPS TAKE 1 TABLET BY MOUTH EVERY DAY 30 capsule 4    fluticasone-umeclidin-vilant (TRELEGY ELLIPTA) 100-62.5-25 MCG/INH AEPB Inhale 1 puff into the lungs daily      aspirin 81 MG tablet Take 81 mg by mouth daily      tiZANidine (ZANAFLEX) 4 MG tablet Take 1 tablet by mouth 3 times daily (Patient not taking: Reported on 7/13/2020) 30 tablet 0     No current facility-administered medications for this visit. Allergies   Allergen Reactions    Morphine Hives    Morphine And Related        Review of Systems   Constitutional: Negative for activity change, appetite change and fever. HENT: Negative for congestion and nosebleeds. Eyes: Negative for discharge. Respiratory: Negative for cough and wheezing. Cardiovascular: Negative for chest pain and leg swelling. Gastrointestinal: Negative for abdominal pain, diarrhea, nausea and vomiting. Genitourinary: Negative for difficulty urinating, frequency and urgency. Musculoskeletal: Positive for arthralgias and back pain. Negative for gait problem. Skin: Negative for color change and rash. Neurological: Negative for dizziness and headaches. Hematological: Does not bruise/bleed easily. Psychiatric/Behavioral: Positive for dysphoric mood. Negative for sleep disturbance and suicidal ideas. The patient is nervous/anxious. OBJECTIVE:     Physical Exam  Constitutional:       Appearance: Normal appearance. She is well-developed and normal weight. HENT:      Head: Normocephalic and atraumatic. with bilateral sciatica    Anxiety    Other orders  -     sertraline (ZOLOFT) 50 MG tablet; Take 1 tablet by mouth daily  -     amLODIPine (NORVASC) 5 MG tablet; Take 1 tablet by mouth daily        PLAN:    Decrease lexapro and start zoloft. Weaning instructions given. Continue amlodipine. Follow-up with us in 6 months for checkup unless needed sooner. MRI was ordered today of her back. We will notify her of the results. Referral is been put in for GI for colonoscopy. See fasting lab orders. Will notify of results. EMR Dragon/transcription disclaimer:  Much of this encounter note is electronic transcription/translation of spoken language toprinted texts. The electronic translation of spoken language may be erroneous, or at times, nonsensical words or phrases may be inadvertently transcribed. Although I have reviewed the note for such errors, some may stillexist.On the basis of positive PHQ-9 screening (PHQ-9 Total Score: 23), the following plan was implemented: medication prescribed: Zoloft- 50 mg- patient will call for any significant medication side effects or worsening symptoms of depression. Patient will follow-up in 4 week(s) with PCP.

## 2020-07-13 NOTE — TELEPHONE ENCOUNTER
Patient is scheduled for a MRI on July 28th and states she needs something to take before she has it done.

## 2020-07-15 RX ORDER — LORAZEPAM 1 MG/1
1 TABLET ORAL
Qty: 2 TABLET | Refills: 0 | Status: SHIPPED | OUTPATIENT
Start: 2020-07-15 | End: 2020-07-15

## 2020-07-20 ENCOUNTER — TELEPHONE (OUTPATIENT)
Dept: PULMONOLOGY | Facility: CLINIC | Age: 65
End: 2020-07-20

## 2020-07-22 ASSESSMENT — ENCOUNTER SYMPTOMS
ABDOMINAL PAIN: 0
COLOR CHANGE: 0
BACK PAIN: 1
VOMITING: 0
EYE DISCHARGE: 0
COUGH: 0
NAUSEA: 0
DIARRHEA: 0
WHEEZING: 0

## 2020-07-24 ENCOUNTER — HOSPITAL ENCOUNTER (OUTPATIENT)
Dept: CT IMAGING | Facility: HOSPITAL | Age: 65
Discharge: HOME | End: 2020-07-24
Admitting: INTERNAL MEDICINE

## 2020-07-24 DIAGNOSIS — R91.8 LUNG NODULES: ICD-10-CM

## 2020-07-24 PROCEDURE — 71250 CT THORAX DX C-: CPT

## 2020-07-24 NOTE — PROGRESS NOTES
Let pt know this looked ok.  Nothing else to do for now.  Everything is stable.  Will want to follow that up again next year.  Keep follow up as planned

## 2020-07-29 ENCOUNTER — TELEPHONE (OUTPATIENT)
Dept: PRIMARY CARE CLINIC | Age: 65
End: 2020-07-29

## 2020-07-29 RX ORDER — RISPERIDONE 1 MG/1
1 TABLET, FILM COATED ORAL NIGHTLY
Qty: 30 TABLET | Refills: 3 | Status: SHIPPED | OUTPATIENT
Start: 2020-07-29 | End: 2020-08-20 | Stop reason: SDUPTHER

## 2020-07-29 NOTE — TELEPHONE ENCOUNTER
She has only been on it for 2 weeks so it has not had long enough to build up in her system. I would give it another couple of weeks before we make a change because typically it takes 4 weeks for it to see what is going to be. I can send you some Risperdal over to the pharmacy for her to try to help with mood stability until we can get the Zoloft fully in her system.

## 2020-07-31 RX ORDER — TOPIRAMATE 50 MG/1
TABLET, FILM COATED ORAL
Qty: 60 TABLET | Refills: 1 | Status: SHIPPED | OUTPATIENT
Start: 2020-07-31 | End: 2020-09-04 | Stop reason: SDUPTHER

## 2020-08-03 RX ORDER — ROPINIROLE 0.5 MG/1
TABLET, FILM COATED ORAL
Qty: 60 TABLET | Refills: 1 | Status: SHIPPED | OUTPATIENT
Start: 2020-08-03 | End: 2021-02-23 | Stop reason: SDUPTHER

## 2020-08-13 ENCOUNTER — OFFICE VISIT (OUTPATIENT)
Dept: PRIMARY CARE CLINIC | Age: 65
End: 2020-08-13
Payer: MEDICARE

## 2020-08-13 VITALS
SYSTOLIC BLOOD PRESSURE: 154 MMHG | HEIGHT: 65 IN | RESPIRATION RATE: 16 BRPM | WEIGHT: 137.6 LBS | DIASTOLIC BLOOD PRESSURE: 84 MMHG | BODY MASS INDEX: 22.92 KG/M2 | TEMPERATURE: 96.5 F | OXYGEN SATURATION: 97 % | HEART RATE: 79 BPM

## 2020-08-13 PROCEDURE — 99214 OFFICE O/P EST MOD 30 MIN: CPT | Performed by: FAMILY MEDICINE

## 2020-08-13 PROCEDURE — G0009 ADMIN PNEUMOCOCCAL VACCINE: HCPCS | Performed by: FAMILY MEDICINE

## 2020-08-13 PROCEDURE — 90732 PPSV23 VACC 2 YRS+ SUBQ/IM: CPT | Performed by: FAMILY MEDICINE

## 2020-08-13 RX ORDER — UBIDECARENONE 75 MG
50 CAPSULE ORAL DAILY
COMMUNITY

## 2020-08-13 RX ORDER — M-VIT,TX,IRON,MINS/CALC/FOLIC 27MG-0.4MG
1 TABLET ORAL DAILY
COMMUNITY

## 2020-08-13 RX ORDER — FLUTICASONE PROPIONATE 50 MCG
2 SPRAY, SUSPENSION (ML) NASAL DAILY
Qty: 1 BOTTLE | Refills: 3 | Status: SHIPPED | OUTPATIENT
Start: 2020-08-13 | End: 2020-11-04

## 2020-08-13 RX ORDER — PREDNISONE 10 MG/1
TABLET ORAL
Qty: 21 TABLET | Refills: 0 | Status: SHIPPED | OUTPATIENT
Start: 2020-08-13 | End: 2020-10-12 | Stop reason: SDUPTHER

## 2020-08-13 RX ORDER — CETIRIZINE HYDROCHLORIDE 10 MG/1
10 TABLET ORAL DAILY
Qty: 30 TABLET | Refills: 0 | Status: SHIPPED | OUTPATIENT
Start: 2020-08-13 | End: 2020-09-04

## 2020-08-13 ASSESSMENT — ENCOUNTER SYMPTOMS
VOMITING: 0
ABDOMINAL PAIN: 0
RHINORRHEA: 1
COUGH: 0
BACK PAIN: 0
NAUSEA: 0
WHEEZING: 0
COLOR CHANGE: 0
DIARRHEA: 0
EYE DISCHARGE: 0

## 2020-08-13 NOTE — PROGRESS NOTES
SUBJECTIVE:    Patient ID: Justin Damon is a 75 y.o. female. HPI:   Patient presents today for a 1 month follow-up on her depression and anxiety. She feels like the Zoloft has significantly helped. She is also taking the Risperdal at night. She states that combination is worked very well for her and she would like to continue the current dosages. She states that she feels very good and feels like her moods have stabilized out. She denies any suicidal thoughts or ideation. She states she is also having trouble with allergies and congestion. She states that she has had a lot of postnasal drainage and she states that she is coughing. She states that she is coughing up some white sputum. She denies any fevers or chills. She denies any shortness of breath or wheezing. She states that she is a smoker. She states that she has not had any sore throat. She denies any nausea or vomiting. She denies any known sick exposure.     Past Medical History:   Diagnosis Date    Anxiety     [facility]ch[facility] ([facility]     Depression     GERD (gastroesophageal reflux disease)     Hypertension     Neuropathy     Restless legs syndrome       Current Outpatient Medications   Medication Sig Dispense Refill    Multiple Vitamins-Minerals (THERAPEUTIC MULTIVITAMIN-MINERALS) tablet Take 1 tablet by mouth daily      vitamin B-12 (CYANOCOBALAMIN) 100 MCG tablet Take 50 mcg by mouth daily      fluticasone (FLONASE) 50 MCG/ACT nasal spray 2 sprays by Nasal route daily 1 Bottle 3    cetirizine (ZYRTEC) 10 MG tablet Take 1 tablet by mouth daily 30 tablet 0    predniSONE (DELTASONE) 10 MG tablet Take 6 tablets on day 1, 5 tablets on day 2, 4 tablets on day 3, 3 tablets on day 4, 2 tablets on day 5 and 1 tablet on day 6 21 tablet 0    sertraline (ZOLOFT) 50 MG tablet Take 1 tablet by mouth daily 90 tablet 3    rOPINIRole (REQUIP) 0.5 MG tablet TAKE 1 TABLET BY MOUTH TWICE A DAY 60 tablet 1    topiramate (TOPAMAX) 50 MG tablet TAKE 1 TABLET BY MOUTH TWICE A DAY 60 tablet 1    risperiDONE (RISPERDAL) 1 MG tablet Take 1 tablet by mouth nightly 30 tablet 3    amLODIPine (NORVASC) 5 MG tablet Take 1 tablet by mouth daily 30 tablet 5    colestipol (COLESTID) 1 g tablet TAKE 1 TABLET BY MOUTH TWICE A  tablet 1    tiZANidine (ZANAFLEX) 4 MG tablet Take 1 tablet by mouth 3 times daily 30 tablet 0    ondansetron (ZOFRAN ODT) 4 MG disintegrating tablet Take 1 tablet by mouth every 8 hours as needed for Nausea or Vomiting 15 tablet 0    traZODone (DESYREL) 150 MG tablet TAKE 1 TABLET BY MOUTH EVERY DAY AT NIGHT 90 tablet 3    gabapentin (NEURONTIN) 300 MG capsule Take 1 capsule by mouth 3 times daily for 180 days. Intended supply: 30 days 90 capsule 1    fluticasone-umeclidin-vilant (TRELEGY ELLIPTA) 100-62.5-25 MCG/INH AEPB Inhale 1 puff into the lungs daily      aspirin 81 MG tablet Take 81 mg by mouth daily       No current facility-administered medications for this visit. Allergies   Allergen Reactions    Morphine Hives    Morphine And Related        Review of Systems   Constitutional: Negative for activity change, appetite change and fever. HENT: Positive for congestion, postnasal drip and rhinorrhea. Negative for nosebleeds. Eyes: Negative for discharge. Respiratory: Negative for cough and wheezing. Cardiovascular: Negative for chest pain and leg swelling. Gastrointestinal: Negative for abdominal pain, diarrhea, nausea and vomiting. Genitourinary: Negative for difficulty urinating, frequency and urgency. Musculoskeletal: Negative for back pain and gait problem. Skin: Negative for color change and rash. Neurological: Negative for dizziness and headaches. Hematological: Does not bruise/bleed easily. Psychiatric/Behavioral: Negative for sleep disturbance and suicidal ideas. OBJECTIVE:     Physical Exam  Vitals signs reviewed.    Constitutional:       General: She is not in acute distress. Appearance: Normal appearance. She is well-developed. She is not diaphoretic. HENT:      Head: Normocephalic and atraumatic. Right Ear: Tympanic membrane, ear canal and external ear normal.      Left Ear: Tympanic membrane, ear canal and external ear normal.      Nose: Congestion present. Mouth/Throat:      Mouth: Mucous membranes are moist.   Neck:      Musculoskeletal: Normal range of motion and neck supple. Cardiovascular:      Rate and Rhythm: Normal rate and regular rhythm. Pulses: Normal pulses. Heart sounds: Normal heart sounds. No murmur. Pulmonary:      Effort: Pulmonary effort is normal. No respiratory distress. Breath sounds: Normal breath sounds. Skin:     General: Skin is warm and dry. Neurological:      General: No focal deficit present. Mental Status: She is alert and oriented to person, place, and time. Mental status is at baseline. Psychiatric:         Mood and Affect: Mood normal.         Behavior: Behavior normal.         Thought Content: Thought content normal.         Judgment: Judgment normal.        BP (!) 154/84 (Site: Left Upper Arm, Position: Sitting, Cuff Size: Medium Adult)   Pulse 79   Temp 96.5 °F (35.8 °C) (Temporal)   Resp 16   Ht 5' 5\" (1.651 m)   Wt 137 lb 9.6 oz (62.4 kg)   SpO2 97%   BMI 22.90 kg/m²      ASSESSMENT:    Donna Rowley was seen today for 1 month follow-up, allergies and back pain.     Diagnoses and all orders for this visit:    Seasonal allergic rhinitis, unspecified trigger    Post-menopausal  -     DEXA BONE DENSITY AXIAL SKELETON; Future    Need for pneumococcal vaccination  -     Pneumococcal polysaccharide vaccine 23-valent greater than or equal to 3yo subcutaneous/IM    Situational anxiety    Current mild episode of major depressive disorder without prior episode ([facility]    Other orders  -     fluticasone (FLONASE) 50 MCG/ACT nasal spray; 2 sprays by Nasal route daily  -     cetirizine (ZYRTEC) 10 MG tablet; Take 1 tablet by mouth daily  -     predniSONE (DELTASONE) 10 MG tablet; Take 6 tablets on day 1, 5 tablets on day 2, 4 tablets on day 3, 3 tablets on day 4, 2 tablets on day 5 and 1 tablet on day 6        PLAN:    Continue Zoloft and Risperdal as it seems to be working well for her. We are going to send over some Flonase, steroids, and a antihistamine to help with her symptoms. If she is not getting better she is to let us know. I encouraged her to drink plenty of fluids. EMR Dragon/transcription disclaimer:  Much of this encounter note is electronic transcription/translation of spoken language toprinted texts. The electronic translation of spoken language may be erroneous, or at times, nonsensical words or phrases may be inadvertently transcribed.   Although I have reviewed the note for such errors, some may stillexist.

## 2020-08-13 NOTE — PROGRESS NOTES
After obtaining consent, and per orders of Dr. Iraida Eller, injection of Pneumovax 23 given in Left deltoid by Juan Huang. Pt tolerated well.

## 2020-08-17 ENCOUNTER — HOSPITAL ENCOUNTER (OUTPATIENT)
Dept: WOMENS IMAGING | Age: 65
Discharge: HOME | End: 2020-08-17
Payer: MEDICARE

## 2020-08-17 ENCOUNTER — HOSPITAL ENCOUNTER (OUTPATIENT)
Dept: MRI IMAGING | Age: 65
Discharge: HOME | End: 2020-08-17
Payer: MEDICARE

## 2020-08-17 PROCEDURE — 77080 DXA BONE DENSITY AXIAL: CPT

## 2020-08-17 PROCEDURE — 77063 BREAST TOMOSYNTHESIS BI: CPT

## 2020-08-17 PROCEDURE — 72148 MRI LUMBAR SPINE W/O DYE: CPT

## 2020-08-20 RX ORDER — RISPERIDONE 1 MG/1
1 TABLET, FILM COATED ORAL NIGHTLY
Qty: 90 TABLET | Refills: 3 | Status: SHIPPED | OUTPATIENT
Start: 2020-08-20 | End: 2020-08-28 | Stop reason: SDUPTHER

## 2020-08-28 RX ORDER — RISPERIDONE 1 MG/1
1 TABLET, FILM COATED ORAL NIGHTLY
Qty: 90 TABLET | Refills: 3 | Status: SHIPPED | OUTPATIENT
Start: 2020-08-28 | End: 2021-11-04

## 2020-09-04 RX ORDER — LORATADINE 10 MG
TABLET ORAL
Qty: 30 TABLET | Refills: 0 | Status: SHIPPED | OUTPATIENT
Start: 2020-09-04 | End: 2021-05-27

## 2020-09-04 RX ORDER — GABAPENTIN 300 MG/1
300 CAPSULE ORAL 3 TIMES DAILY
Qty: 90 CAPSULE | Refills: 1 | Status: SHIPPED | OUTPATIENT
Start: 2020-09-04 | End: 2020-10-28 | Stop reason: SDUPTHER

## 2020-09-04 RX ORDER — TOPIRAMATE 50 MG/1
TABLET, FILM COATED ORAL
Qty: 60 TABLET | Refills: 1 | Status: SHIPPED | OUTPATIENT
Start: 2020-09-04 | End: 2020-10-27

## 2020-09-08 ENCOUNTER — TELEPHONE (OUTPATIENT)
Dept: NEUROSURGERY | Age: 65
End: 2020-09-08

## 2020-09-08 ENCOUNTER — TELEPHONE (OUTPATIENT)
Dept: PRIMARY CARE CLINIC | Age: 65
End: 2020-09-08

## 2020-09-08 NOTE — TELEPHONE ENCOUNTER
Pt referral was put in for Neurosurgery on 8/31 and she has not heard from them yet. She wants to know is there anything she can take for back pain until she sees them that there are some mornings she cannot get up hardly due to pain.

## 2020-09-08 NOTE — TELEPHONE ENCOUNTER
Neurosurgical New Patient Questionnaire       Referring physician? Coral    Reason for referral?         Back pain    Who is completing questionnaire? patient     Has the patient had any previous spinal/brain surgeries? no        MRI images obtained?        yes      If yes,  where was the MRI performed?      [facility]    Note: If scan was performed at a facility other than [facility], the disk will need to be brought to appointment. Patient agreed to bring disk?      no     What part of the body?       lumbar     When was it performed? Sept 2020    Has the patient had a NCV/EMG?  no      Physical Therapy? yes     If yes, where was PT completed?      [facility]     What is/was the duration of therapy? 3-4 months     Pain Management?          no          Symptoms? Low back pain radiating to right side of hip and leg. See other msg. Patient advised in her email.

## 2020-09-09 NOTE — TELEPHONE ENCOUNTER
Pt notified Andi Bower said      I would just have her take tylenol/aleve/ibuprofen until she gets in with neurosurgery.

## 2020-09-16 ENCOUNTER — OFFICE VISIT (OUTPATIENT)
Dept: NEUROSURGERY | Age: 65
End: 2020-09-16
Payer: MEDICARE

## 2020-09-16 VITALS
BODY MASS INDEX: 22.99 KG/M2 | SYSTOLIC BLOOD PRESSURE: 124 MMHG | HEART RATE: 83 BPM | HEIGHT: 65 IN | DIASTOLIC BLOOD PRESSURE: 67 MMHG | WEIGHT: 138 LBS

## 2020-09-16 DIAGNOSIS — M53.3 SI (SACROILIAC) JOINT DYSFUNCTION: ICD-10-CM

## 2020-09-16 PROCEDURE — 99203 OFFICE O/P NEW LOW 30 MIN: CPT | Performed by: NEUROLOGICAL SURGERY

## 2020-09-16 NOTE — PROGRESS NOTES
[facility] Neurosurgery  Office Visit        Chief Complaint   Patient presents with    Consultation     back pain. imaging in chart. HISTORY OF PRESENT ILLNESS:      The patient is a 75 y.o. female who presents for neurosurgical evaluation of chronic back pain. 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 She describes mostly right-sided lower back pain centered over the sacroiliac region. The pain radiates into her buttock and hip. She has occasional paresthesias in her hip as well. She denies any other numbness or weakness in her legs. The pain is worsened by activity and somewhat relieved by rest.  When asked to localize her pain, she points to the R SI joint. MEDICAL HISTORY:             Past Medical History:   Diagnosis Date    Anxiety     COPD (chronic obstructive pulmonary disease) ([address]     Depression     GERD (gastroesophageal reflux disease)     Hypertension     Neuropathy     Restless legs syndrome        Past Surgical History:   Procedure Laterality Date    APPENDECTOMY      CHOLECYSTECTOMY      COLONOSCOPY  2017    [place]-2 polyps per patient     HYSTERECTOMY, TOTAL ABDOMINAL           Current Outpatient Medications:     topiramate (TOPAMAX) 50 MG tablet, TAKE 1 TABLET BY MOUTH TWICE A DAY, Disp: 60 tablet, Rfl: 1    gabapentin (NEURONTIN) 300 MG capsule, Take 1 capsule by mouth 3 times daily for 180 days.  Intended supply: 30 days, Disp: 90 capsule, Rfl: 1    [employer] INDOOR/OUTDOOR ALLERGY RLF 10 MG tablet, TAKE 1 TABLET BY MOUTH EVERY DAY, Disp: 30 tablet, Rfl: 0    risperiDONE (RISPERDAL) 1 MG tablet, Take 1 tablet by mouth nightly, Disp: 90 tablet, Rfl: 3    Multiple Vitamins-Minerals (THERAPEUTIC MULTIVITAMIN-MINERALS) tablet, Take 1 tablet by mouth daily, Disp: , Rfl:     vitamin B-12 (CYANOCOBALAMIN) 100 MCG tablet, Take 50 mcg by mouth daily, Disp: , Rfl:     fluticasone (FLONASE) 50 MCG/ACT nasal spray, 2 sprays by Nasal route daily, Disp: 1 Bottle, Rfl: 3    predniSONE (DELTASONE) 10 MG tablet, Take 6 tablets on day 1, 5 tablets on day 2, 4 tablets on day 3, 3 tablets on day 4, 2 tablets on day 5 and 1 tablet on day 6, Disp: 21 tablet, Rfl: 0    sertraline (ZOLOFT) 50 MG tablet, Take 1 tablet by mouth daily, Disp: 90 tablet, Rfl: 3    rOPINIRole (REQUIP) 0.5 MG tablet, TAKE 1 TABLET BY MOUTH TWICE A DAY, Disp: 60 tablet, Rfl: 1    amLODIPine (NORVASC) 5 MG tablet, Take 1 tablet by mouth daily, Disp: 30 tablet, Rfl: 5    colestipol (COLESTID) 1 g tablet, TAKE 1 TABLET BY MOUTH TWICE A DAY, Disp: 180 tablet, Rfl: 1    tiZANidine (ZANAFLEX) 4 MG tablet, Take 1 tablet by mouth 3 times daily, Disp: 30 tablet, Rfl: 0    ondansetron (ZOFRAN ODT) 4 MG disintegrating tablet, Take 1 tablet by mouth every 8 hours as needed for Nausea or Vomiting, Disp: 15 tablet, Rfl: 0    traZODone (DESYREL) 150 MG tablet, TAKE 1 TABLET BY MOUTH EVERY DAY AT NIGHT, Disp: 90 tablet, Rfl: 3    fluticasone-umeclidin-vilant (TRELEGY ELLIPTA) 100-62.5-25 MCG/INH AEPB, Inhale 1 puff into the lungs daily, Disp: , Rfl:     aspirin 81 MG tablet, Take 81 mg by mouth daily, Disp: , Rfl:     Allergies:  Morphine and Morphine and related    Social History:   Social History     Tobacco Use   Smoking Status Current Some Day Smoker    Packs/day: 0.25   Smokeless Tobacco Never Used     Social History     Substance and Sexual Activity   Alcohol Use None         Family History:   Family History   Problem Relation Age of Onset    High Blood Pressure Mother     Cancer Mother     Heart Attack Father     Cancer Brother     Colon Cancer Neg Hx     Colon Polyps Neg Hx        REVIEW OF SYSTEMS:    Constitutional: Negative. HENT: Negative. Eyes: Negative. Respiratory: Negative. Cardiovascular: Negative. Gastrointestinal: Negative. Genitourinary: Negative. sensation  Right Lower Extremity: normal light touch sensation  Left Lower Extremity: normal light touch sensation      REFLEXES:    Biceps: 2+  Patella: 2+    Angulo's: Negative      COORDINATION and GAIT:    Gait: Antalgic      DATA:    Lab Results   Component Value Date    WBC 8.2 07/13/2020    HGB 14.1 07/13/2020    HCT 44.8 07/13/2020    MCV 97.8 07/13/2020     07/13/2020     Lab Results   Component Value Date     07/13/2020    K 4.4 07/13/2020     07/13/2020    CO2 23 07/13/2020    BUN 11 07/13/2020    CREATININE 0.9 07/13/2020    GLUCOSE 103 07/13/2020    CALCIUM 8.9 07/13/2020    PROT 7.0 07/13/2020    LABALBU 3.9 07/13/2020    BILITOT <0.2 07/13/2020    ALKPHOS 105 (H) 07/13/2020    AST 13 07/13/2020    ALT 9 07/13/2020    LABGLOM >60 07/13/2020   No results found for: INR, PROTIME    No results found. IMAGING:    My interpretation of imaging studies: MRI of the lumbar spine was reviewed. There are modest multilevel degenerative changes. Lumbar lordosis is preserved. There is no significant spinal canal, lateral recess or foraminal stenosis at any level in the lumbar spine. ASSESSMENT AND PLAN:  This is a 75 y.o. female who presents with progressive and severe right-sided lower back pain that appears to localize to her right SI joint. There is no pathology on her lumbar MRI scan that would explain her current pain. I have recommended that she be evaluated by pain management for a right SI joint injection for both diagnostic and therapeutic purposes. I have also recommended that she attempt a course of physical therapy focused on the right SI joint. I will plan to see her again in 1 month to assess her response.             Emilie Menendez MD

## 2020-09-16 NOTE — Clinical Note
Referral for two purposes  1) Diagnostic/therapeutic SI joint injection with arthrogram  2) eval and treat R SI joint dysfunction

## 2020-09-17 ENCOUNTER — NURSE ONLY (OUTPATIENT)
Dept: PRIMARY CARE CLINIC | Age: 65
End: 2020-09-17
Payer: MEDICARE

## 2020-09-17 PROCEDURE — 90654 INFLUENZA, QUADV, ADJUVANTED, 65 YRS +, IM, PF, PREFILL SYR, 0.5ML (FLUAD): CPT | Performed by: FAMILY MEDICINE

## 2020-09-17 PROCEDURE — G0008 ADMIN INFLUENZA VIRUS VAC: HCPCS | Performed by: FAMILY MEDICINE

## 2020-09-25 ENCOUNTER — HOSPITAL ENCOUNTER (OUTPATIENT)
Dept: GENERAL RADIOLOGY | Age: 65
Discharge: HOME | End: 2020-09-25
Payer: MEDICARE

## 2020-09-25 PROCEDURE — 72170 X-RAY EXAM OF PELVIS: CPT

## 2020-09-30 RX ORDER — CETIRIZINE HYDROCHLORIDE 10 MG/1
10 TABLET ORAL DAILY
Qty: 90 TABLET | Refills: 0 | Status: SHIPPED | OUTPATIENT
Start: 2020-09-30 | End: 2020-10-27 | Stop reason: SDUPTHER

## 2020-10-12 ENCOUNTER — OFFICE VISIT (OUTPATIENT)
Dept: PRIMARY CARE CLINIC | Age: 65
End: 2020-10-12
Payer: MEDICARE

## 2020-10-12 VITALS
TEMPERATURE: 98.9 F | OXYGEN SATURATION: 94 % | HEART RATE: 83 BPM | HEIGHT: 65 IN | BODY MASS INDEX: 23.46 KG/M2 | WEIGHT: 140.8 LBS | DIASTOLIC BLOOD PRESSURE: 82 MMHG | SYSTOLIC BLOOD PRESSURE: 134 MMHG

## 2020-10-12 PROCEDURE — 99213 OFFICE O/P EST LOW 20 MIN: CPT | Performed by: NURSE PRACTITIONER

## 2020-10-12 RX ORDER — DEXTROMETHORPHAN HYDROBROMIDE AND PROMETHAZINE HYDROCHLORIDE 15; 6.25 MG/5ML; MG/5ML
SYRUP ORAL
Qty: 120 ML | Refills: 0 | Status: SHIPPED | OUTPATIENT
Start: 2020-10-12 | End: 2020-10-19

## 2020-10-12 RX ORDER — BENZONATATE 100 MG/1
100 CAPSULE ORAL 3 TIMES DAILY PRN
Qty: 30 CAPSULE | Refills: 0 | Status: SHIPPED | OUTPATIENT
Start: 2020-10-12 | End: 2020-10-19

## 2020-10-12 RX ORDER — BENZONATATE 100 MG/1
100 CAPSULE ORAL 2 TIMES DAILY PRN
Qty: 20 CAPSULE | Refills: 0 | Status: SHIPPED | OUTPATIENT
Start: 2020-10-12 | End: 2020-10-19

## 2020-10-12 RX ORDER — AZITHROMYCIN 250 MG/1
TABLET, FILM COATED ORAL
Qty: 1 PACKET | Refills: 0 | Status: SHIPPED | OUTPATIENT
Start: 2020-10-12 | End: 2020-10-22

## 2020-10-12 RX ORDER — TIZANIDINE 4 MG/1
4 TABLET ORAL 3 TIMES DAILY
Qty: 30 TABLET | Refills: 0 | Status: SHIPPED | OUTPATIENT
Start: 2020-10-12 | End: 2020-10-26 | Stop reason: ALTCHOICE

## 2020-10-12 RX ORDER — PREDNISONE 10 MG/1
TABLET ORAL
Qty: 21 TABLET | Refills: 0 | Status: SHIPPED | OUTPATIENT
Start: 2020-10-12 | End: 2021-02-02

## 2020-10-12 RX ORDER — CEFDINIR 300 MG/1
300 CAPSULE ORAL 2 TIMES DAILY
Qty: 14 CAPSULE | Refills: 0 | Status: SHIPPED | OUTPATIENT
Start: 2020-10-12 | End: 2020-10-19

## 2020-10-12 ASSESSMENT — ENCOUNTER SYMPTOMS
SINUS PAIN: 1
EYE PAIN: 0
COUGH: 1
SHORTNESS OF BREATH: 0
SORE THROAT: 1
EYE DISCHARGE: 0
GASTROINTESTINAL NEGATIVE: 1
EYE ITCHING: 0
WHEEZING: 1
RHINORRHEA: 1

## 2020-10-12 ASSESSMENT — VISUAL ACUITY: OU: 1

## 2020-10-12 NOTE — PROGRESS NOTES
 Smoking status: Current Some Day Smoker     Packs/day: 0.25    Smokeless tobacco: Never Used   Substance Use Topics    Alcohol use: Not on file      Current Outpatient Medications   Medication Sig Dispense Refill    predniSONE (DELTASONE) 10 MG tablet Take 6 tablets on day 1, 5 tablets on day 2, 4 tablets on day 3, 3 tablets on day 4, 2 tablets on day 5 and 1 tablet on day 6 21 tablet 0    benzonatate (TESSALON) 100 MG capsule Take 1 capsule by mouth 3 times daily as needed for Cough 30 capsule 0    tiZANidine (ZANAFLEX) 4 MG tablet Take 1 tablet by mouth 3 times daily 30 tablet 0    cetirizine (ZYRTEC) 10 MG tablet Take 1 tablet by mouth daily 90 tablet 0    topiramate (TOPAMAX) 50 MG tablet TAKE 1 TABLET BY MOUTH TWICE A DAY 60 tablet 1    gabapentin (NEURONTIN) 300 MG capsule Take 1 capsule by mouth 3 times daily for 180 days.  Intended supply: 30 days 90 capsule 1    [employer] INDOOR/OUTDOOR ALLERGY RLF 10 MG tablet TAKE 1 TABLET BY MOUTH EVERY DAY 30 tablet 0    risperiDONE (RISPERDAL) 1 MG tablet Take 1 tablet by mouth nightly 90 tablet 3    Multiple Vitamins-Minerals (THERAPEUTIC MULTIVITAMIN-MINERALS) tablet Take 1 tablet by mouth daily      vitamin B-12 (CYANOCOBALAMIN) 100 MCG tablet Take 50 mcg by mouth daily      fluticasone (FLONASE) 50 MCG/ACT nasal spray 2 sprays by Nasal route daily 1 Bottle 3    sertraline (ZOLOFT) 50 MG tablet Take 1 tablet by mouth daily 90 tablet 3    rOPINIRole (REQUIP) 0.5 MG tablet TAKE 1 TABLET BY MOUTH TWICE A DAY 60 tablet 1    amLODIPine (NORVASC) 5 MG tablet Take 1 tablet by mouth daily 30 tablet 5    colestipol (COLESTID) 1 g tablet TAKE 1 TABLET BY MOUTH TWICE A  tablet 1    ondansetron (ZOFRAN ODT) 4 MG disintegrating tablet Take 1 tablet by mouth every 8 hours as needed for Nausea or Vomiting 15 tablet 0    traZODone (DESYREL) 150 MG tablet TAKE 1 TABLET BY MOUTH EVERY DAY AT NIGHT 90 tablet 3    fluticasone-umeclidin-vilant (TRELEGY ELLIPTA) 100-62.5-25 MCG/INH AEPB Inhale 1 puff into the lungs daily      aspirin 81 MG tablet Take 81 mg by mouth daily       No current facility-administered medications for this visit. Allergies   Allergen Reactions    Morphine Hives    Morphine And Related        Health Maintenance   Topic Date Due    Hepatitis C screen  1955    Shingles Vaccine (1 of 2) 12/06/2003    Colon cancer screen colonoscopy  01/19/2017    DTaP/Tdap/Td vaccine (1 - Tdap) 07/13/2021 (Originally 12/6/1974)    Annual Wellness Visit (AWV)  11/21/2020    Breast cancer screen  08/17/2022    Lipid screen  07/13/2025    DEXA (modify frequency per FRAX score)  Completed    Flu vaccine  Completed    Pneumococcal 65+ years Vaccine  Completed    Hepatitis A vaccine  Aged Out    Hepatitis B vaccine  Aged Out    Hib vaccine  Aged Out    Meningococcal (ACWY) vaccine  Aged Out       Subjective:      Review of Systems   Constitutional: Negative for chills and fever. HENT: Positive for congestion, ear pain, postnasal drip, rhinorrhea, sinus pain and sore throat. Eyes: Negative for pain, discharge, itching and visual disturbance. Respiratory: Positive for cough and wheezing. Negative for shortness of breath. Cardiovascular: Negative for chest pain. Gastrointestinal: Negative. Endocrine: Negative. Genitourinary: Negative. Musculoskeletal: Negative. Skin: Negative. Allergic/Immunologic: Positive for environmental allergies. Neurological: Positive for headaches. Negative for dizziness. Psychiatric/Behavioral: Negative. Objective:     Physical Exam  Vitals signs and nursing note reviewed. Constitutional:       General: She is not in acute distress. Appearance: Normal appearance. She is normal weight. She is not ill-appearing, toxic-appearing or diaphoretic. HENT:      Head: Normocephalic and atraumatic. Right Ear: Ear canal and external ear normal. There is no impacted cerumen.       Left trigger     2. Acute allergic serous otitis media of right ear     3. Bibasilar crackles  XR CHEST STANDARD (2 VW)         Plan:   More than 50% of the time was spent counseling and coordinating care for a total time of 15 min face to face. 1. & 2. Continue Claritin and Flonase. Add benadryl at night to decrease secretions. 3. cxr today. Patient given educational materials -see patient instructions. Discussed use, benefit, and side effects of prescribed medications. All patient questions answered. Pt voiced understanding. Reviewed health maintenance. Instructed to continue currentmedications, diet and exercise. Patient agreed with treatment plan. Follow up as directed. MEDICATIONS:  Orders Placed This Encounter   Medications    predniSONE (DELTASONE) 10 MG tablet     Sig: Take 6 tablets on day 1, 5 tablets on day 2, 4 tablets on day 3, 3 tablets on day 4, 2 tablets on day 5 and 1 tablet on day 6     Dispense:  21 tablet     Refill:  0    benzonatate (TESSALON) 100 MG capsule     Sig: Take 1 capsule by mouth 3 times daily as needed for Cough     Dispense:  30 capsule     Refill:  0    tiZANidine (ZANAFLEX) 4 MG tablet     Sig: Take 1 tablet by mouth 3 times daily     Dispense:  30 tablet     Refill:  0         ORDERS:  Orders Placed This Encounter   Procedures    XR CHEST STANDARD (2 VW)       Follow-up:  Return if symptoms worsen or fail to improve. PATIENT INSTRUCTIONS:  Patient Instructions   Viral syndrome   Many illnesses are caused by viruses. These conditions usually run their course in 7-14 days but may take up to 21 days to resolve. Antibiotics do not help fight viral infections and are not needed at this time. Viral syndromes are treated with symptomatic support. You may take tylenol or ibuprofen for fever or aches and pains. Stay hydrated by taking sips of water or non caffeinated, noncarbonated, and nonalcoholic beverages throughout the day.  Call if you have a fever greater than 80 F or if symptoms improve and then get worse again. Electronically signed by JENNIFER Calles NP on 10/12/2020 at 12:07 PM    EMR Dragon/transcription disclaimer:  Much of thisencounter note is electronic transcription/translation of spoken language to printed texts. The electronic translation of spoken language may be erroneous, or at times, nonsensical words or phrases may be inadvertentlytranscribed.   Although I have reviewed the note for such errors, some may still exist.

## 2020-10-14 RX ORDER — AMLODIPINE BESYLATE 5 MG/1
2.5 TABLET ORAL DAILY
Qty: 90 TABLET | Refills: 3 | Status: SHIPPED | OUTPATIENT
Start: 2020-10-14 | End: 2020-12-30

## 2020-10-26 ENCOUNTER — OFFICE VISIT (OUTPATIENT)
Dept: PRIMARY CARE CLINIC | Age: 65
End: 2020-10-26
Payer: MEDICARE

## 2020-10-26 VITALS
HEIGHT: 65 IN | DIASTOLIC BLOOD PRESSURE: 76 MMHG | TEMPERATURE: 98.9 F | OXYGEN SATURATION: 95 % | HEART RATE: 82 BPM | SYSTOLIC BLOOD PRESSURE: 112 MMHG | BODY MASS INDEX: 23.19 KG/M2 | WEIGHT: 139.2 LBS

## 2020-10-26 PROCEDURE — 99213 OFFICE O/P EST LOW 20 MIN: CPT | Performed by: NURSE PRACTITIONER

## 2020-10-26 RX ORDER — GUAIFENESIN 600 MG/1
600 TABLET, EXTENDED RELEASE ORAL 2 TIMES DAILY
Qty: 30 TABLET | Refills: 0 | Status: SHIPPED | OUTPATIENT
Start: 2020-10-26 | End: 2020-11-10

## 2020-10-26 RX ORDER — BACLOFEN 10 MG/1
10 TABLET ORAL 3 TIMES DAILY
Qty: 15 TABLET | Refills: 0 | Status: SHIPPED | OUTPATIENT
Start: 2020-10-26 | End: 2020-11-05 | Stop reason: ALTCHOICE

## 2020-10-26 ASSESSMENT — ENCOUNTER SYMPTOMS
EYES NEGATIVE: 1
WHEEZING: 1
ABDOMINAL PAIN: 0
SHORTNESS OF BREATH: 1
COUGH: 1

## 2020-10-26 NOTE — PROGRESS NOTES
[facility] PHYSICIAN SERVICES  [facility]  [address]  [address] [address]  Dept: [phone]  Dept Fax: [phone]  Loc: [phone]    Felicia Cason is a 75 y.o. female who presents today for her medical conditions/complaints as noted below. Felicia Cason is c/o of Follow-up (Pt states she's still coughing and very tired.)        HPI:     HPI     Pt presents today for follow up on pneumonia. She states that she still has fatigue and is coughing up mucus. She denies fever and states that she hasn't smoked in 2 weeks. She requests increased muscle relaxer since her current dose doesn't seem to work as well anymore. She has been on this for some time. Chief Complaint   Patient presents with    Follow-up     Pt states she's still coughing and very tired.      Past Medical History:   Diagnosis Date    Anxiety     COPD (chronic obstructive pulmonary disease) (HCC)     Depression     GERD (gastroesophageal reflux disease)     Hypertension     Neuropathy     Restless legs syndrome       Past Surgical History:   Procedure Laterality Date    APPENDECTOMY      CHOLECYSTECTOMY      COLONOSCOPY  2017    [place]-2 polyps per patient     HYSTERECTOMY, TOTAL ABDOMINAL         Vitals 10/26/2020 10/12/2020 9/16/2020 8/13/2020 7/13/2020 2/5/3335   SYSTOLIC [phone]   DIASTOLIC 76 82 67 84 78 72   Site - - - Left Upper Arm Left Upper Arm -   Position - - - Sitting Sitting -   Cuff Size - - - Medium Adult Medium Adult -   Pulse 82 83 83 79 75 111   Temp 98.9 98.9 - 96.5 97.1 98.8   Resp - - - 16 16 -   SpO2 95 94 - 97 - 95   Weight 139 lb 3.2 oz 140 lb 12.8 oz 138 lb 137 lb 9.6 oz 134 lb 6.4 oz 135 lb   Height 5' 5\" 5' 5\" 5' 5\" 5' 5\" 5' 5\" 5' 5\"   Body mass index 23.16 kg/m2 23.43 kg/m2 22.96 kg/m2 22.9 kg/m2 22.36 kg/m2 22.46 kg/m2   Pain Level - - - - - -       Family History   Problem Relation Age of Onset    High Blood Pressure Mother     Cancer Mother     Heart Attack educational materials -see patient instructions. Discussed use, benefit, and side effects of prescribed medications. All patient questions answered. Pt voiced understanding. Reviewed health maintenance. Instructed to continue currentmedications, diet and exercise. Patient agreed with treatment plan. Follow up as directed. MEDICATIONS:  Orders Placed This Encounter   Medications    baclofen (LIORESAL) 10 MG tablet     Sig: Take 1 tablet by mouth 3 times daily     Dispense:  15 tablet     Refill:  0    guaiFENesin (MUCINEX) 600 MG extended release tablet     Sig: Take 1 tablet by mouth 2 times daily for 15 days     Dispense:  30 tablet     Refill:  0         ORDERS:  Orders Placed This Encounter   Procedures    XR CHEST STANDARD (2 VW)       Follow-up:  Return in about 6 months (around 4/26/2021). PATIENT INSTRUCTIONS:  There are no Patient Instructions on file for this visit. Electronically signed by JENNIFER Urban NP on 10/26/2020 at 10:20 AM    EMR Dragon/transcription disclaimer:  Much of thisencounter note is electronic transcription/translation of spoken language to printed texts. The electronic translation of spoken language may be erroneous, or at times, nonsensical words or phrases may be inadvertentlytranscribed.   Although I have reviewed the note for such errors, some may still exist.

## 2020-10-27 RX ORDER — TOPIRAMATE 50 MG/1
TABLET, FILM COATED ORAL
Qty: 60 TABLET | Refills: 1 | Status: SHIPPED | OUTPATIENT
Start: 2020-10-27 | End: 2020-12-28

## 2020-10-27 RX ORDER — CETIRIZINE HYDROCHLORIDE 10 MG/1
10 TABLET ORAL DAILY
Qty: 90 TABLET | Refills: 0 | Status: SHIPPED | OUTPATIENT
Start: 2020-10-27 | End: 2020-11-26

## 2020-10-28 ENCOUNTER — TELEPHONE (OUTPATIENT)
Dept: PRIMARY CARE CLINIC | Age: 65
End: 2020-10-28

## 2020-10-28 DIAGNOSIS — J44.9 STAGE 2 MODERATE COPD BY GOLD CLASSIFICATION (HCC): Primary | ICD-10-CM

## 2020-10-28 DIAGNOSIS — J43.2 CENTRILOBULAR EMPHYSEMA (HCC): ICD-10-CM

## 2020-10-28 RX ORDER — GABAPENTIN 300 MG/1
300 CAPSULE ORAL 3 TIMES DAILY
Qty: 90 CAPSULE | Refills: 1 | Status: SHIPPED | OUTPATIENT
Start: 2020-10-28 | End: 2021-02-04 | Stop reason: SDUPTHER

## 2020-10-28 NOTE — TELEPHONE ENCOUNTER
Pharmacy sent a request for refills on [facility]. Patient was last seen on 1/14/20 and has a return appointment on 1/11/20.  Refill request sent to Dr. Wray for approval.

## 2020-11-04 RX ORDER — FLUTICASONE PROPIONATE 50 MCG
2 SPRAY, SUSPENSION (ML) NASAL DAILY
Qty: 3 BOTTLE | Refills: 3 | Status: SHIPPED | OUTPATIENT
Start: 2020-11-04

## 2020-11-04 RX ORDER — FLUTICASONE PROPIONATE 50 MCG
SPRAY, SUSPENSION (ML) NASAL
Qty: 1 BOTTLE | Refills: 1 | Status: SHIPPED | OUTPATIENT
Start: 2020-11-04 | End: 2020-11-04 | Stop reason: SDUPTHER

## 2020-11-05 ENCOUNTER — HOSPITAL ENCOUNTER (OUTPATIENT)
Dept: PAIN MANAGEMENT | Age: 65
Discharge: HOME | End: 2020-11-05
Payer: MEDICARE

## 2020-11-05 VITALS
BODY MASS INDEX: 23.03 KG/M2 | HEIGHT: 65 IN | SYSTOLIC BLOOD PRESSURE: 130 MMHG | DIASTOLIC BLOOD PRESSURE: 76 MMHG | OXYGEN SATURATION: 93 % | HEART RATE: 81 BPM | WEIGHT: 138.25 LBS | TEMPERATURE: 98.9 F

## 2020-11-05 PROBLEM — M53.3 SACROILIAC JOINT DYSFUNCTION OF BOTH SIDES: Status: ACTIVE | Noted: 2020-11-05

## 2020-11-05 PROBLEM — M47.816 FACET ARTHRITIS OF LUMBAR REGION: Status: ACTIVE | Noted: 2020-11-05

## 2020-11-05 PROBLEM — Z02.89 PAIN MEDICATION AGREEMENT: Status: ACTIVE | Noted: 2020-11-05

## 2020-11-05 PROBLEM — M54.50 CHRONIC BILATERAL LOW BACK PAIN WITHOUT SCIATICA: Status: ACTIVE | Noted: 2019-10-16

## 2020-11-05 PROBLEM — M79.18 CHRONIC MYOFASCIAL PAIN: Status: ACTIVE | Noted: 2020-11-05

## 2020-11-05 PROBLEM — G89.29 CHRONIC MYOFASCIAL PAIN: Status: ACTIVE | Noted: 2020-11-05

## 2020-11-05 PROCEDURE — 99214 OFFICE O/P EST MOD 30 MIN: CPT | Performed by: NURSE PRACTITIONER

## 2020-11-05 PROCEDURE — 99215 OFFICE O/P EST HI 40 MIN: CPT

## 2020-11-05 RX ORDER — TRAMADOL HYDROCHLORIDE 50 MG/1
50 TABLET ORAL EVERY 8 HOURS PRN
Qty: 75 TABLET | Refills: 2 | Status: SHIPPED | OUTPATIENT
Start: 2020-11-05 | End: 2020-12-05

## 2020-11-05 RX ORDER — MELOXICAM 7.5 MG/1
7.5 TABLET ORAL 2 TIMES DAILY
Qty: 60 TABLET | Refills: 2 | Status: SHIPPED | OUTPATIENT
Start: 2020-11-05 | End: 2021-02-02

## 2020-11-05 RX ORDER — TIZANIDINE 4 MG/1
TABLET ORAL
Qty: 60 TABLET | Refills: 2 | Status: SHIPPED | OUTPATIENT
Start: 2020-11-05 | End: 2021-02-02 | Stop reason: SDUPTHER

## 2020-11-05 SDOH — HEALTH STABILITY: MENTAL HEALTH: HOW OFTEN DO YOU HAVE A DRINK CONTAINING ALCOHOL?: NEVER

## 2020-11-05 ASSESSMENT — PAIN DESCRIPTION - ORIENTATION: ORIENTATION: LOWER

## 2020-11-05 ASSESSMENT — PAIN DESCRIPTION - LOCATION: LOCATION: BACK

## 2020-11-05 ASSESSMENT — PAIN SCALES - GENERAL: PAINLEVEL_OUTOF10: 9

## 2020-11-05 ASSESSMENT — PAIN DESCRIPTION - PAIN TYPE: TYPE: CHRONIC PAIN

## 2020-11-05 ASSESSMENT — ENCOUNTER SYMPTOMS
BOWEL INCONTINENCE: 0
BACK PAIN: 1

## 2020-11-05 NOTE — PROGRESS NOTES
Blood Thinner Request Form  [] Obtained Test Results / Consult Notes  [] UDS due at next visit, verified per EPIC      [] Suspected Physical Abuse or Suicide Risk assessed - IF YES COMPLETE QUESTIONS BELOW    If any of the following questions are answered yes - contact attending physician for referral:    Has been considering harming self to escape stress, pain problems? [] YES  [] NO  Has a suicide plan? [] YES  [] NO  Has attempted suicide in the past?   [] YES  [] NO  Has a close friend or family member who committed suicide?   [] YES  [] NO    Assessment Completed by:  Electronically signed by Jennifer Coulter RN on 11/5/2020 at 8:01 AM

## 2020-11-05 NOTE — H&P
[facility] Physical & Pain Medicine    History and Physical    Patient Name: Yves Hair    MR #: [id number]    Account [de-identified]    : 1955    Age: 75 y.o. Sex: female    Date: 2020    PCP: Ulysses Watkins MD    Referring Provider: Dr. Esau Saunders    Chief Complaint:   Chief Complaint   Patient presents with    Lower Back Pain       History of Present Illness: The patient is a 75 y.o. female who was referrred with primary complaints of chronic low back pain. Patient's pain started many years ago after transferring a patient in Nursing home. The other caregiver dropped her side. Patient went to therapy and back did improve. However over the years her back pain gradually worsened. Patient's never had surgical interventions. Patient is mainly across low back. Patient has gradually stopped doing the things that increase or she takes very rest periods. Patient can barely get down on her hands and knees to clean her floors. Patient states that she feels guilty because she can clean her house the way she used to clean it. Patient has more difficulty getting up from a seated position than the act of setting down. Patient can not lie flat and she sleeps in her recliner. Back Pain   This is a chronic problem. The current episode started more than 1 year ago. The problem occurs constantly. The problem has been gradually worsening since onset. The pain is present in the lumbar spine and sacro-iliac. The quality of the pain is described as stabbing. The pain does not radiate. The pain is the same all the time. The symptoms are aggravated by bending, sitting and standing. Pertinent negatives include no bladder incontinence, bowel incontinence, leg pain, numbness, tingling or weakness.        Screening Tools:     PEG Score: 9     Last PEG Score: N/A     Annual ORT Score: 1     Annual PHQ Score: 15    Current Pain Assessment  Pain Assessment  Pain Assessment: 0-10  Pain Level: 9  Pain Type: Chronic pain  Pain Location: Back  Pain Orientation: Lower    Employment: former CNA    Past Medical History  Past Medical History:   Diagnosis Date    Anxiety     COPD [facility]chronic obstructive pulmonary disease) ([facility])     Depression     GERD (gastroesophageal reflux disease)     Hypertension     Neuropathy     Restless legs syndrome        Medications  Current Outpatient Medications   Medication Sig Dispense Refill    meloxicam (MOBIC) 7.5 MG tablet Take 1 tablet by mouth 2 times daily Take with food 60 tablet 2    traMADol (ULTRAM) 50 MG tablet Take 1 tablet by mouth every 8 hours as needed for Pain for up to 30 days. 30 day supply 75 tablet 2    tiZANidine (ZANAFLEX) 4 MG tablet 1/4 tablet with meals 1/2 to whole tablet at bedtime 60 tablet 2    fluticasone (FLONASE) 50 MCG/ACT nasal spray 2 sprays by Nasal route daily 3 Bottle 3    gabapentin (NEURONTIN) 300 MG capsule Take 1 capsule by mouth 3 times daily for 180 days.  Intended supply: 30 days 90 capsule 1    topiramate (TOPAMAX) 50 MG tablet TAKE 1 TABLET BY MOUTH TWICE A DAY 60 tablet 1    cetirizine (ZYRTEC) 10 MG tablet Take 1 tablet by mouth daily 90 tablet 0    guaiFENesin (MUCINEX) 600 MG extended release tablet Take 1 tablet by mouth 2 times daily for 15 days 30 tablet 0    amLODIPine (NORVASC) 5 MG tablet Take 0.5 tablets by mouth daily Take 1 tablet daily 90 tablet 3    predniSONE (DELTASONE) 10 MG tablet Take 6 tablets on day 1, 5 tablets on day 2, 4 tablets on day 3, 3 tablets on day 4, 2 tablets on day 5 and 1 tablet on day 6 21 tablet 0    [employer] INDOOR/OUTDOOR ALLERGY RLF 10 MG tablet TAKE 1 TABLET BY MOUTH EVERY DAY 30 tablet 0    risperiDONE (RISPERDAL) 1 MG tablet Take 1 tablet by mouth nightly 90 tablet 3    Multiple Vitamins-Minerals (THERAPEUTIC MULTIVITAMIN-MINERALS) tablet Take 1 tablet by mouth daily      vitamin B-12 (CYANOCOBALAMIN) 100 MCG tablet Take 50 mcg by mouth daily      sertraline (ZOLOFT) 50 MG tablet Take 1 tablet by mouth daily 90 tablet 3    rOPINIRole (REQUIP) 0.5 MG tablet TAKE 1 TABLET BY MOUTH TWICE A DAY 60 tablet 1    colestipol (COLESTID) 1 g tablet TAKE 1 TABLET BY MOUTH TWICE A  tablet 1    ondansetron (ZOFRAN ODT) 4 MG disintegrating tablet Take 1 tablet by mouth every 8 hours as needed for Nausea or Vomiting 15 tablet 0    traZODone (DESYREL) 150 MG tablet TAKE 1 TABLET BY MOUTH EVERY DAY AT NIGHT 90 tablet 3    fluticasone-umeclidin-vilant (TRELEGY ELLIPTA) 100-62.5-25 MCG/INH AEPB Inhale 1 puff into the lungs daily      aspirin 81 MG tablet Take 81 mg by mouth daily       No current facility-administered medications for this encounter. Allergies  Morphine and Morphine and related    Current Medications  Current Outpatient Medications   Medication Sig Dispense Refill    meloxicam (MOBIC) 7.5 MG tablet Take 1 tablet by mouth 2 times daily Take with food 60 tablet 2    traMADol (ULTRAM) 50 MG tablet Take 1 tablet by mouth every 8 hours as needed for Pain for up to 30 days. 30 day supply 75 tablet 2    tiZANidine (ZANAFLEX) 4 MG tablet 1/4 tablet with meals 1/2 to whole tablet at bedtime 60 tablet 2    fluticasone (FLONASE) 50 MCG/ACT nasal spray 2 sprays by Nasal route daily 3 Bottle 3    gabapentin (NEURONTIN) 300 MG capsule Take 1 capsule by mouth 3 times daily for 180 days.  Intended supply: 30 days 90 capsule 1    topiramate (TOPAMAX) 50 MG tablet TAKE 1 TABLET BY MOUTH TWICE A DAY 60 tablet 1    cetirizine (ZYRTEC) 10 MG tablet Take 1 tablet by mouth daily 90 tablet 0    guaiFENesin (MUCINEX) 600 MG extended release tablet Take 1 tablet by mouth 2 times daily for 15 days 30 tablet 0    amLODIPine (NORVASC) 5 MG tablet Take 0.5 tablets by mouth daily Take 1 tablet daily 90 tablet 3    predniSONE (DELTASONE) 10 MG tablet Take 6 tablets on day 1, 5 tablets on day 2, 4 tablets on day 3, 3 tablets on day 4, 2 tablets on day 5 and 1 tablet on day 6 21 tablet 0    [employer]S INDOOR/OUTDOOR ALLERGY RLF 10 MG tablet TAKE 1 TABLET BY MOUTH EVERY DAY 30 tablet 0    risperiDONE (RISPERDAL) 1 MG tablet Take 1 tablet by mouth nightly 90 tablet 3    Multiple Vitamins-Minerals (THERAPEUTIC MULTIVITAMIN-MINERALS) tablet Take 1 tablet by mouth daily      vitamin B-12 (CYANOCOBALAMIN) 100 MCG tablet Take 50 mcg by mouth daily      sertraline (ZOLOFT) 50 MG tablet Take 1 tablet by mouth daily 90 tablet 3    rOPINIRole (REQUIP) 0.5 MG tablet TAKE 1 TABLET BY MOUTH TWICE A DAY 60 tablet 1    colestipol (COLESTID) 1 g tablet TAKE 1 TABLET BY MOUTH TWICE A  tablet 1    ondansetron (ZOFRAN ODT) 4 MG disintegrating tablet Take 1 tablet by mouth every 8 hours as needed for Nausea or Vomiting 15 tablet 0    traZODone (DESYREL) 150 MG tablet TAKE 1 TABLET BY MOUTH EVERY DAY AT NIGHT 90 tablet 3    fluticasone-umeclidin-vilant (TRELEGY ELLIPTA) 100-62.5-25 MCG/INH AEPB Inhale 1 puff into the lungs daily      aspirin 81 MG tablet Take 81 mg by mouth daily       No current facility-administered medications for this encounter. Social History    Social History     Socioeconomic History    Marital status:       Spouse name: None    Number of children: None    Years of education: None    Highest education level: None   Occupational History    None   Social Needs    Financial resource strain: None    Food insecurity     Worry: None     Inability: None    Transportation needs     Medical: None     Non-medical: None   Tobacco Use    Smoking status: Current Some Day Smoker     Packs/day: 0.25     Years: 50.00     Pack years: 12.50     Types: Cigarettes    Smokeless tobacco: Never Used   Substance and Sexual Activity    Alcohol use: Never     Frequency: Never    Drug use: Never    Sexual activity: None   Lifestyle    Physical activity     Days per week: None     Minutes per session: None    Stress: None   Relationships    Social connections     Talks on phone: None Tenderness: There is no abdominal tenderness. Musculoskeletal:      Lumbar back: She exhibits decreased range of motion, tenderness, pain and spasm. Comments: Patient points to bilaterally SI joint as the source of low back pain  TTP of SI joint bilaterally  Deferred SI exam as patient can not lie flat for assessment    bilaterally piriformis muscle is not taut - rope like - with palpable tender knots identified - Trigger points    No TTP for troch bursa bilaterally    SLR Negative for pain on bilaterally    Negative Lasegue Maneuver bilaterally    Positive  for pain elicited with flex and extension lumbar spine   Skin:     General: Skin is warm and dry. Neurological:      Mental Status: She is alert and oriented to person, place, and time. Cranial Nerves: No cranial nerve deficit. Psychiatric:         Behavior: Behavior normal.         Thought Content: Thought content normal.         Judgment: Judgment normal.         Diagnostics:     MRI exams received in the past 2 years:  MRI Lumbar Spine       When 8/17/2020                                              Where [facility])       Imaging on chart: Yes         Imaging records requested: No     CT exam received during the last 12 months: No       When na                                              Where na       Imaging on chart: No         Imaging records requested: No     X-ray exam received during the last 12 months: Yes XR Pelvis & XR Lumbar Spine       When 9/25/2020 & 11/2019                                             Where [address] on chart: Yes         Imaging records requested: No     Nerve Conduction Study/EMG:  No       When na                                              Where na       Imaging on chart: No         Imaging records requested:  No     Physical therapy: Yes       When: 1/2020                                                   Where [facility] No       When: na need to inject addition levels in the future. Patient may stop her own asa. Patient given education on facet injections, muscle spasms and pain management and medications. Discussion: Discussed exam findings and plan of care with patient. Patient agreed with POC and questions were asked and answered. Activity: discussed exercise as beneficial to pain reduction, encouraged stretching exercise with a focus on torso strengthening. Goal:  Pain Management Goals of Therapy:   [] Resolution in pain  [x] Decrease in pain level  [x] Improvement in ADL's  [x] Increase in activities with less pain  [] Decrease in medication     Controlled substance monitoring:    [x] Discussed medication side effects, risk of tolerance and/or dependence, and/or alternative treatment  [] Discussed the detrimental effects of long term narcotic use in younger patients especially women of childbearing years. [x] No signs and symptoms of potential drug abuse or diversion were identified  [] Signs of potential drug abuse or diversion were identified   [] ORT Score   [] UDS non-compliant   [] See Note  [] Random urine drug screen sent today  [x] Random urine drug screen not completed today   [x] Deferred New Patient  [] Compliant   [] Not Compliant see note  [x] Medication agreement with provider signed today  [] Medication agreement with provider on file under media   [] Medication regimen effective with no c/o side effects and continued   [x] New patient continuing current medication while developing POC   [] On going assessment and evaluation of medication regimen  [] Medication regimen not effective see plan for changes  [x] Mayela Showers reviewed & on file under media     CC:  MD Archana Manzo APRN - CNP, 11/5/2020 at 10:16 AM    EMR dragon/transcription disclaimer: Much of this encounter note is electronic transcription/translation of spoken language to printed tach.  Electronic translation of spoken language may be erroneous, or at times, nonsensical words or phrases may be inadvertently transcribed.  Although, I have reviewed the note for such errors, some may still exist.

## 2020-11-15 ASSESSMENT — ENCOUNTER SYMPTOMS: CONSTIPATION: 0

## 2020-11-18 ENCOUNTER — HOSPITAL ENCOUNTER (OUTPATIENT)
Dept: PAIN MANAGEMENT | Age: 65
Discharge: HOME | End: 2020-11-18
Payer: MEDICARE

## 2020-11-18 VITALS
RESPIRATION RATE: 18 BRPM | SYSTOLIC BLOOD PRESSURE: 129 MMHG | DIASTOLIC BLOOD PRESSURE: 53 MMHG | HEART RATE: 89 BPM | OXYGEN SATURATION: 91 %

## 2020-11-18 PROCEDURE — 2500000003 HC RX 250 WO HCPCS

## 2020-11-18 PROCEDURE — 6360000002 HC RX W HCPCS

## 2020-11-18 PROCEDURE — 20552 NJX 1/MLT TRIGGER POINT 1/2: CPT | Performed by: NURSE PRACTITIONER

## 2020-11-18 PROCEDURE — 20611 DRAIN/INJ JOINT/BURSA W/US: CPT

## 2020-11-18 PROCEDURE — 76942 ECHO GUIDE FOR BIOPSY: CPT | Performed by: NURSE PRACTITIONER

## 2020-11-18 RX ORDER — TRIAMCINOLONE ACETONIDE 40 MG/ML
80 INJECTION, SUSPENSION INTRA-ARTICULAR; INTRAMUSCULAR ONCE
Status: DISCONTINUED | OUTPATIENT
Start: 2020-11-18 | End: 2020-11-20 | Stop reason: HOSPADM

## 2020-11-18 RX ORDER — LIDOCAINE HYDROCHLORIDE 10 MG/ML
4 INJECTION, SOLUTION EPIDURAL; INFILTRATION; INTRACAUDAL; PERINEURAL ONCE
Status: DISCONTINUED | OUTPATIENT
Start: 2020-11-18 | End: 2020-11-20 | Stop reason: HOSPADM

## 2020-11-18 RX ORDER — BUPIVACAINE HYDROCHLORIDE 5 MG/ML
4 INJECTION, SOLUTION EPIDURAL; INTRACAUDAL ONCE
Status: DISCONTINUED | OUTPATIENT
Start: 2020-11-18 | End: 2020-11-20 | Stop reason: HOSPADM

## 2020-11-18 NOTE — PROCEDURES
[facility] Physical & Pain Medicine    Patient Name: Tita Pena    : 1955    Age: 75 y.o. Sex: female    Date: 2020    Pre-op Diagnosis: bilateral  Sacroiliac Joint(s) Dysfunction/ Sacroiliitis    Post-op Diagnosis: bilateral  Sacroiliac Joint(s) Dysfunction/ Sacroliliits    Procedure: Ultrasound Guided Injection of  bilateral Sacroiliac Joint(s)     Performing Procedure:  Jimmy Mendoza, MARIA VICTORIAP - BC, VA-BC    Patient Vitals for the past 24 hrs:   BP Pulse Resp SpO2   20 0749 (!) 129/53 89 18 91 %       bilateral  Sacroiliac Joint(s)     Description of Procedure:    After voluntary, informed and signed consent obtained the patient was placed in a prone position. Appropriate time out was obtained per policy. The Sacroiliac Joint(s) was palpated for area of maximal tenderness. The area was prepped in an aseptic fashion with CHG swab. The ultrasound transducer was used to confirm the appropriate location. The skin was sprayed with Gebauer's Solution. Under aseptic technique and direct ultrasound visualization a 22 gauge 3 inch spinal needle was introduced into the Sacroiliac Joint(s). After a negative aspiration, a solution of 2 ml of 0.5% Marcaine Plain and 2 ml of 1% Lidocaine Plain and 1 ml of Kenalog (40 mg/ml) was injected into the Sacroiliac Joint(s). The needle was withdrawn and a sterile dressing applied. If this was a bilateral procedure, the same steps were followed on the opposite side. Discharge: The patient tolerated the procedure well. There were no complications during the procedure and the patient was discharged home with discharge instructions. The patient has been instructed to contact the office should there be any complications or questions to arise between today and their next appointment. Plan:    The patient will follow up in the office in approximately 6 weeks.      JENNIFER Castellanos CNP, 2020 at 8:41 AM

## 2020-11-24 ENCOUNTER — TELEPHONE (OUTPATIENT)
Dept: PAIN MANAGEMENT | Age: 65
End: 2020-11-24

## 2020-11-24 NOTE — TELEPHONE ENCOUNTER
Spoke with pt concerning her injections she had on 11/18. Pt states she is still hurting pretty bad. States she was a 9 and now she is an 8. Explained it may not have reached it's maximum effects just yet. No further questions.

## 2020-12-18 ENCOUNTER — NURSE TRIAGE (OUTPATIENT)
Dept: OTHER | Facility: CLINIC | Age: 65
End: 2020-12-18

## 2020-12-18 NOTE — TELEPHONE ENCOUNTER
Reason for Disposition   MODERATE vomiting (e.g., 3 - 5 times/day) and age > 61    Answer Assessment - Initial Assessment Questions  1. VOMITING SEVERITY: \"How many times have you vomited in the past 24 hours? \"      - MILD:  1 - 2 times/day     - MODERATE: 3 - 5 times/day, decreased oral intake without significant weight loss or symptoms of dehydration     - SEVERE: 6 or more times/day, vomits everything or nearly everything, with significant weight loss, symptoms of dehydration       4x moderate    2. ONSET: \"When did the vomiting begin? \"       2 days ago    3. FLUIDS: \"What fluids or food have you vomited up today? \" \"Have you been able to keep any fluids down? \"      Yes, have not vomited up any food/fluids yet today    4. ABDOMINAL PAIN: Jerica Barton your having any abdominal pain? \" If yes : \"How bad is it and what does it feel like? \" (e.g., crampy, dull, intermittent, constant)       No    5. DIARRHEA: \"Is there any diarrhea? \" If so, ask: \"How many times today? \"       No    6. CONTACTS: \"Is there anyone else in the family with the same symptoms? \"       No    7. CAUSE: \"What do you think is causing your vomiting? \"      I have no idea    8. HYDRATION STATUS: \"Any signs of dehydration? \" (e.g., dry mouth [not only dry lips], too weak to stand) \"When did you last urinate? \"      No signs of dehydration, urinated this am    9. OTHER SYMPTOMS: \"Do you have any other symptoms? \" (e.g., fever, headache, vertigo, vomiting blood or coffee grounds, recent head injury)      Headaches (usually have migraines), cough (have copd),    10. PREGNANCY: \"Is there any chance you are pregnant? \" \"When was your last menstrual period? \"        no    Protocols used: VOMITING-ADULT-[place]    Patient called pre-service center [facility] with red flag complaint.      Brief description of triage: vomiting, cough (COPD), Migraine ha    Triage indicates for patient to Go to ED now    Care advice provided, patient verbalizes understanding; denies any other questions or concerns; instructed to call back for any new or worsening symptoms. Attention Provider: Thank you for allowing me to participate in the care of your patient. The patient was connected to triage in response to information provided to the ECC. Please do not respond through this encounter as the response is not directed to a shared pool.

## 2020-12-21 RX ORDER — TRAZODONE HYDROCHLORIDE 150 MG/1
TABLET ORAL
Qty: 90 TABLET | Refills: 3 | Status: SHIPPED | OUTPATIENT
Start: 2020-12-21

## 2020-12-28 RX ORDER — TOPIRAMATE 50 MG/1
TABLET, FILM COATED ORAL
Qty: 60 TABLET | Refills: 1 | Status: SHIPPED | OUTPATIENT
Start: 2020-12-28 | End: 2021-02-04 | Stop reason: SDUPTHER

## 2020-12-30 RX ORDER — AMLODIPINE BESYLATE 5 MG/1
TABLET ORAL
Qty: 90 TABLET | Refills: 1 | Status: SHIPPED | OUTPATIENT
Start: 2020-12-30 | End: 2021-05-27 | Stop reason: SDUPTHER

## 2021-01-02 DIAGNOSIS — R19.7 DIARRHEA, UNSPECIFIED TYPE: ICD-10-CM

## 2021-01-04 RX ORDER — MONTELUKAST SODIUM 4 MG/1
TABLET, CHEWABLE ORAL
Qty: 180 TABLET | Refills: 1 | Status: SHIPPED | OUTPATIENT
Start: 2021-01-04 | End: 2021-07-19 | Stop reason: SDUPTHER

## 2021-01-08 ENCOUNTER — HOSPITAL ENCOUNTER (OUTPATIENT)
Dept: PAIN MANAGEMENT | Age: 66
Discharge: HOME | End: 2021-01-08
Payer: MEDICARE

## 2021-01-08 VITALS
DIASTOLIC BLOOD PRESSURE: 71 MMHG | HEART RATE: 94 BPM | OXYGEN SATURATION: 92 % | SYSTOLIC BLOOD PRESSURE: 121 MMHG | TEMPERATURE: 97 F | RESPIRATION RATE: 18 BRPM

## 2021-01-08 DIAGNOSIS — M51.36 LUMBAR DEGENERATIVE DISC DISEASE: ICD-10-CM

## 2021-01-08 PROCEDURE — 64493 INJ PARAVERT F JNT L/S 1 LEV: CPT

## 2021-01-08 PROCEDURE — 6360000002 HC RX W HCPCS

## 2021-01-08 PROCEDURE — 2500000003 HC RX 250 WO HCPCS

## 2021-01-08 PROCEDURE — 3209999900 FLUORO FOR SURGICAL PROCEDURES

## 2021-01-08 PROCEDURE — 64494 INJ PARAVERT F JNT L/S 2 LEV: CPT

## 2021-01-08 RX ORDER — METHYLPREDNISOLONE ACETATE 80 MG/ML
INJECTION, SUSPENSION INTRA-ARTICULAR; INTRALESIONAL; INTRAMUSCULAR; SOFT TISSUE
Status: COMPLETED | OUTPATIENT
Start: 2021-01-08 | End: 2021-01-08

## 2021-01-08 RX ORDER — BUPIVACAINE HYDROCHLORIDE 5 MG/ML
INJECTION, SOLUTION EPIDURAL; INTRACAUDAL
Status: COMPLETED | OUTPATIENT
Start: 2021-01-08 | End: 2021-01-08

## 2021-01-08 RX ORDER — LIDOCAINE HYDROCHLORIDE 10 MG/ML
INJECTION, SOLUTION EPIDURAL; INFILTRATION; INTRACAUDAL; PERINEURAL
Status: COMPLETED | OUTPATIENT
Start: 2021-01-08 | End: 2021-01-08

## 2021-01-08 RX ADMIN — LIDOCAINE HYDROCHLORIDE 20 ML: 10 INJECTION, SOLUTION EPIDURAL; INFILTRATION; INTRACAUDAL; PERINEURAL at 08:32

## 2021-01-08 RX ADMIN — BUPIVACAINE HYDROCHLORIDE 5 ML: 5 INJECTION, SOLUTION EPIDURAL; INTRACAUDAL at 08:33

## 2021-01-08 RX ADMIN — METHYLPREDNISOLONE ACETATE 80 MG: 80 INJECTION, SUSPENSION INTRA-ARTICULAR; INTRALESIONAL; INTRAMUSCULAR; SOFT TISSUE at 08:33

## 2021-01-08 ASSESSMENT — PAIN DESCRIPTION - PAIN TYPE: TYPE: CHRONIC PAIN

## 2021-01-08 ASSESSMENT — PAIN DESCRIPTION - DESCRIPTORS: DESCRIPTORS: ACHING;THROBBING

## 2021-01-08 ASSESSMENT — PAIN SCALES - GENERAL: PAINLEVEL_OUTOF10: 10

## 2021-01-08 NOTE — INTERVAL H&P NOTE
Update History & Physical    The patient's History and Physical  was reviewed with the patient and I examined the patient. There was  NO CHANGE:[id number]}. The surgical site was confirmed by the patient and me. Plan: The risks, benefits, expected outcome, and alternative to the recommended procedure have been discussed with the patient. Patient understands and wants to proceed with the procedure.      Electronically signed by Carole Ibrahim MD on 1/8/2021 at 9:04 AM

## 2021-01-10 NOTE — PROGRESS NOTES
"Background:  Pt with 70 pk year smoking hx, 13 mm LLL nodule, 4 mm rml nodule.  PET scan 8/2019 indicated 12 mm intrafissural nodule and 3 mm rml nodule, stable 1/2020 need follow up through 6/2021   Chief Complaint  Lung Nodules (No hospitalizations; no testing; no exposure)    Subjective    History of Present Illness {CC  Problem List  Visit  Diagnosis   Encounters  Notes  Medications  Labs  Result Review Imaging  Media :23}     Georgina Bernard presents to [facility].  Pt follows up with hx emphysema and moderate airflow obstruction in 2019 and lung nodules that need follow up for 6 more months.  Last ct was done following our last office visit, reviewed below.  Pt has hx nocturnal respiratory failure. And on triple inhaler (trelegy and albuterol) therapy.  Trelegy is helpful.  She mainly complains of falling and problems with her back.  She had bacterial pneumonia earlier this year.     Objective     Vital Signs:   /86   Pulse 80   Temp 97.1 °F (36.2 °C)   Ht 165.1 cm (65\")   Wt 63.5 kg (140 lb)   SpO2 97% Comment: RA  BMI 23.30 kg/m²   Physical Exam  Pulmonary:      Effort: No respiratory distress.      Breath sounds: No wheezing.   Neurological:      Mental Status: She is alert.        Result Review  Data Reviewed:{ Labs  Result Review  Imaging  Med Tab  Media :23}   CT Chest Without Contrast (07/24/2020 11:21).  My interpretation of film: stable fissural oblong nodule on left, small nodule RML, centrilobular emphysema    PFT Values        Some values may be hidden. Unless noted otherwise, only the newest values recorded on each date are displayed.         Old Values PFT Results 9/11/19   FVC 63%   FEV1 51%   FEV1/FVC 64.51   DLCO 59%      Pre Drug PFT Results 9/11/19   No data to display.      Post Drug PFT Results 9/11/19   No data to display.      Other Tests PFT Results 9/11/19   No data to display.                      Assessment and " Plan {CC Problem List  Visit Diagnosis  ROS  Review (Popup)  Health Maintenance  Quality  BestPractice  Medications  SmartSets  SnapShot Encounters  Media :23}   Problem List Items Addressed This Visit        Pulmonary Problems    Lung nodules    Relevant Orders    CT CHEST WITHOUT CONTRAST DIAGNOSTIC    Centrilobular emphysema (CMS/[facility])    Overview     AAT MM genotype         Relevant Medications    cetirizine ([employer] Indoor/Outdoor Allergy Rlf) 10 MG tablet    fluticasone (FLONASE) 50 MCG/ACT nasal spray    albuterol sulfate  (90 Base) MCG/ACT inhaler    Nocturnal hypoxemia      Other Visit Diagnoses     Stage 2 moderate COPD by GOLD classification (CMS/[facility])    -  Primary    Relevant Medications    cetirizine ([employer] Indoor/Outdoor Allergy Rlf) 10 MG tablet    fluticasone (FLONASE) 50 MCG/ACT nasal spray    albuterol sulfate  (90 Base) MCG/ACT inhaler      continue triple inhaler, trelegy for emphysema and related copd, albuterol as needed  Continue oxygen for nighttime  Refill albuterol  Follow up ct this summer    Follow Up {Instructions Charge Capture  Follow-up Communications :23}   Return in about 6 months (around 7/11/2021) for review CT.  Patient was given instructions and counseling regarding her condition or for health maintenance advice. Please see specific information pulled into the AVS if appropriate.    Electronically signed by Ba Wray MD, 1/11/2021, 10:58 CST

## 2021-01-11 ENCOUNTER — OFFICE VISIT (OUTPATIENT)
Dept: PULMONOLOGY | Facility: CLINIC | Age: 66
End: 2021-01-11

## 2021-01-11 VITALS
SYSTOLIC BLOOD PRESSURE: 142 MMHG | HEART RATE: 80 BPM | OXYGEN SATURATION: 97 % | HEIGHT: 65 IN | TEMPERATURE: 97.1 F | BODY MASS INDEX: 23.32 KG/M2 | WEIGHT: 140 LBS | DIASTOLIC BLOOD PRESSURE: 86 MMHG

## 2021-01-11 DIAGNOSIS — G47.34 NOCTURNAL HYPOXEMIA: ICD-10-CM

## 2021-01-11 DIAGNOSIS — J43.2 CENTRILOBULAR EMPHYSEMA (HCC): ICD-10-CM

## 2021-01-11 DIAGNOSIS — R91.8 LUNG NODULES: ICD-10-CM

## 2021-01-11 DIAGNOSIS — J44.9 STAGE 2 MODERATE COPD BY GOLD CLASSIFICATION (HCC): Primary | ICD-10-CM

## 2021-01-11 PROCEDURE — 99214 OFFICE O/P EST MOD 30 MIN: CPT | Performed by: INTERNAL MEDICINE

## 2021-01-11 RX ORDER — TRAMADOL HYDROCHLORIDE 50 MG/1
TABLET ORAL
COMMUNITY
Start: 2021-01-02

## 2021-01-11 RX ORDER — UBIDECARENONE 75 MG
50 CAPSULE ORAL
COMMUNITY

## 2021-01-11 RX ORDER — FLUTICASONE PROPIONATE 50 MCG
2 SPRAY, SUSPENSION (ML) NASAL
COMMUNITY
Start: 2020-11-04

## 2021-01-11 RX ORDER — CETIRIZINE HYDROCHLORIDE 10 MG/1
TABLET ORAL
COMMUNITY
Start: 2020-09-04

## 2021-01-11 RX ORDER — RISPERIDONE 1 MG/1
1 TABLET ORAL
COMMUNITY
Start: 2020-08-28

## 2021-01-11 RX ORDER — ALBUTEROL SULFATE 90 UG/1
2 AEROSOL, METERED RESPIRATORY (INHALATION) 4 TIMES DAILY PRN
Qty: 18 G | Refills: 1 | Status: SHIPPED | OUTPATIENT
Start: 2021-01-11 | End: 2021-07-19

## 2021-01-11 RX ORDER — BACLOFEN 10 MG/1
10 TABLET ORAL 3 TIMES DAILY
COMMUNITY
Start: 2020-10-26

## 2021-01-11 RX ORDER — MELOXICAM 7.5 MG/1
7.5 TABLET ORAL
COMMUNITY
Start: 2020-11-05 | End: 2021-02-03

## 2021-01-13 ENCOUNTER — TELEPHONE (OUTPATIENT)
Dept: PAIN MANAGEMENT | Age: 66
End: 2021-01-13

## 2021-01-13 NOTE — TELEPHONE ENCOUNTER
I CALLED THE PATIENT AS A FOLLOW UP FROM HER BILATERAL LUMBAR FACET INJECTIONS, L4/5 & L5/S1, THAT SHE HAD ON 1/8/2021 AND AND SHE REPORTED THAT SHE HAS HAD NO RELIEF AT ALL. SHE REPORTED THAT HER PAIN SCORE IS STILL A 10, JUST AS IT WAS WHEN SHE CAME IN FOR THE INJECTION. SHE SAYS THE PAIN IS IN THE LOWER PART OF HER BACK AND DOES NOT RADIATE.

## 2021-02-02 ENCOUNTER — HOSPITAL ENCOUNTER (OUTPATIENT)
Dept: PAIN MANAGEMENT | Age: 66
Discharge: HOME | End: 2021-02-02
Payer: MEDICARE

## 2021-02-02 VITALS
RESPIRATION RATE: 18 BRPM | BODY MASS INDEX: 22.99 KG/M2 | WEIGHT: 138 LBS | HEART RATE: 88 BPM | HEIGHT: 65 IN | TEMPERATURE: 98.6 F | SYSTOLIC BLOOD PRESSURE: 128 MMHG | OXYGEN SATURATION: 92 % | DIASTOLIC BLOOD PRESSURE: 73 MMHG

## 2021-02-02 DIAGNOSIS — G89.29 CHRONIC MYOFASCIAL PAIN: ICD-10-CM

## 2021-02-02 DIAGNOSIS — M79.18 CHRONIC MYOFASCIAL PAIN: ICD-10-CM

## 2021-02-02 DIAGNOSIS — G89.29 CHRONIC BILATERAL LOW BACK PAIN WITHOUT SCIATICA: Primary | ICD-10-CM

## 2021-02-02 DIAGNOSIS — M54.50 CHRONIC BILATERAL LOW BACK PAIN WITHOUT SCIATICA: Primary | ICD-10-CM

## 2021-02-02 PROCEDURE — 99213 OFFICE O/P EST LOW 20 MIN: CPT

## 2021-02-02 PROCEDURE — 99214 OFFICE O/P EST MOD 30 MIN: CPT | Performed by: NURSE PRACTITIONER

## 2021-02-02 RX ORDER — TIZANIDINE 4 MG/1
4 TABLET ORAL 3 TIMES DAILY
Qty: 90 TABLET | Refills: 2 | Status: SHIPPED | OUTPATIENT
Start: 2021-02-02 | End: 2021-05-27 | Stop reason: SDUPTHER

## 2021-02-02 RX ORDER — HYDROCODONE BITARTRATE AND ACETAMINOPHEN 5; 325 MG/1; MG/1
1 TABLET ORAL EVERY 8 HOURS PRN
Qty: 75 TABLET | Refills: 0 | Status: SHIPPED | OUTPATIENT
Start: 2021-02-02 | End: 2021-03-04

## 2021-02-02 ASSESSMENT — ENCOUNTER SYMPTOMS
CONSTIPATION: 0
BACK PAIN: 1
BOWEL INCONTINENCE: 0

## 2021-02-02 ASSESSMENT — PAIN DESCRIPTION - ORIENTATION: ORIENTATION: LOWER

## 2021-02-02 ASSESSMENT — PAIN SCALES - GENERAL: PAINLEVEL_OUTOF10: 10

## 2021-02-02 NOTE — PROGRESS NOTES
[facility]    Office Visit    Patient Name: Kacy Orr    MR #: [id number]    Account [de-identified]    : 1955    Age: 77 y.o. Sex: female    Date: 2021    PCP: Russ Small MD    Chief Complaint:   Chief Complaint   Patient presents with    Lower Back Pain       History of Present Illness: The patient is a 77 y.o. female who presents for procedure follow up and medication changes. Patient was started on ultram and mobic at last appointment. Patient stated that medication was ineffective. Patient received bilateral SI injections on 2020 and bilateral lumbar facet injections on L4/L5 and L5/S1. Patient states that she did not sustain any relief from injections. MRI of Lumbar Spine completed on 2020 revealed as follows  There is disc desiccation of the lower 3 lumbar discs with narrowing at L5-S1. At L2/3 Early facet osteoarthropathy. At L3/4, there is very mild broad-based disc bulging and mild facet  arthropathy and ligamentum flavum hypertrophy, small annular  tear. Borderline central canal stenosis with mild left borderline  right neural foramen narrowing. At L4/5, mild broad-based disc bulging. A small annular tear. Mild  facet arthropathy. Mild central canal stenosis with mild bilateral neural foramen narrowing. At L5-S1, there is mild foraminal disc bulging. Lumbar xray on 2019 indicated degenerative disease at L5/S1. Back Pain  This is a chronic problem. The current episode started more than 1 year ago. The problem occurs constantly. The problem has been gradually worsening since onset. The pain is present in the lumbar spine and sacro-iliac. The quality of the pain is described as stabbing. The pain does not radiate. The pain is the same all the time. The symptoms are aggravated by bending, sitting and standing.  Pertinent negatives include no bladder incontinence, bowel incontinence, leg pain, numbness, tingling or weakness. Screening Tools:     PEG Score: 10     Last PEG Score: 9     Annual ORT Score: 1     Annual PHQ Score: 15    Current Pain Assessment  Pain Assessment  Pain Assessment: 0-10  Pain Level: 10  Pain Type: Chronic pain  Pain Location: Back  Pain Orientation: Lower      Past Visit HPI:  11/5/2020  was referrred with primary complaints of chronic low back pain. Patient's pain started many years ago after transferring a patient in Nursing home. The other caregiver dropped her side. Patient went to therapy and back did improve. However over the years her back pain gradually worsened. Patient's never had surgical interventions. Patient is mainly across low back. Patient has gradually stopped doing the things that increase or she takes very rest periods. Patient can barely get down on her hands and knees to clean her floors. Patient states that she feels guilty because she can't clean her house the way she used to clean it. Patient has more difficulty getting up from a seated position than the act of setting down. Patient can not lie flat and she sleeps in her recliner. Back Pain is a chronic problem. The current episode started more than 1 year ago. The problem occurs constantly. The problem has been gradually worsening since onset. The pain is present in the lumbar spine and sacro-iliac. The quality of the pain is described as stabbing. The pain does not radiate. The pain is the same all the time. The symptoms are aggravated by bending, sitting and standing. Pertinent negatives include no bladder incontinence, bowel incontinence, leg pain, numbness, tingling or weakness.      Employment: former CNA    Past Medical History  Past Medical History:   Diagnosis Date    Anxiety     COPD [facility]chronic obstructive pulmonary disease) ([facility] [address]     Depression     GERD (gastroesophageal reflux disease)     Hypertension     Neuropathy     Restless legs syndrome        Medications  Current  amLODIPine (NORVASC) 5 MG tablet TAKE 1 TABLET BY MOUTH EVERY DAY 90 tablet 1    topiramate (TOPAMAX) 50 MG tablet TAKE 1 TABLET BY MOUTH TWICE A DAY 60 tablet 1    sertraline (ZOLOFT) 50 MG tablet TAKE 1 TABLET BY MOUTH EVERY DAY 90 tablet 3    traZODone (DESYREL) 150 MG tablet TAKE 1 TABLET BY MOUTH EVERY DAY AT NIGHT 90 tablet 3    meloxicam (MOBIC) 7.5 MG tablet Take 1 tablet by mouth 2 times daily Take with food 60 tablet 2    tiZANidine (ZANAFLEX) 4 MG tablet 1/4 tablet with meals 1/2 to whole tablet at bedtime 60 tablet 2    fluticasone (FLONASE) 50 MCG/ACT nasal spray 2 sprays by Nasal route daily 3 Bottle 3    gabapentin (NEURONTIN) 300 MG capsule Take 1 capsule by mouth 3 times daily for 180 days. Intended supply: 30 days 90 capsule 1    [employer] INDOOR/OUTDOOR ALLERGY RLF 10 MG tablet TAKE 1 TABLET BY MOUTH EVERY DAY 30 tablet 0    risperiDONE (RISPERDAL) 1 MG tablet Take 1 tablet by mouth nightly 90 tablet 3    Multiple Vitamins-Minerals (THERAPEUTIC MULTIVITAMIN-MINERALS) tablet Take 1 tablet by mouth daily      vitamin B-12 (CYANOCOBALAMIN) 100 MCG tablet Take 50 mcg by mouth daily      rOPINIRole (REQUIP) 0.5 MG tablet TAKE 1 TABLET BY MOUTH TWICE A DAY 60 tablet 1    ondansetron (ZOFRAN ODT) 4 MG disintegrating tablet Take 1 tablet by mouth every 8 hours as needed for Nausea or Vomiting 15 tablet 0    fluticasone-umeclidin-vilant (TRELEGY ELLIPTA) 100-62.5-25 MCG/INH AEPB Inhale 1 puff into the lungs daily      aspirin 81 MG tablet Take 81 mg by mouth daily       No current facility-administered medications for this encounter. Social History    Social History     Socioeconomic History    Marital status:       Spouse name: None    Number of children: None    Years of education: None    Highest education level: None   Occupational History    None   Social Needs    Financial resource strain: None    Food insecurity     Worry: None     Inability: None    Transportation needs     Medical: None     Non-medical: None   Tobacco Use    Smoking status: Current Some Day Smoker     Packs/day: 0.25     Years: 50.00     Pack years: 12.50     Types: Cigarettes    Smokeless tobacco: Never Used   Substance and Sexual Activity    Alcohol use: Never     Frequency: Never    Drug use: Never    Sexual activity: None   Lifestyle    Physical activity     Days per week: None     Minutes per session: None    Stress: None   Relationships    Social connections     Talks on phone: None     Gets together: None     Attends Temple service: None     Active member of club or organization: None     Attends meetings of clubs or organizations: None     Relationship status: None    Intimate partner violence     Fear of current or ex partner: None     Emotionally abused: None     Physically abused: None     Forced sexual activity: None   Other Topics Concern    None   Social History Narrative    None         Family History  family history includes Cancer in her brother and mother; Heart Attack in her father; High Blood Pressure in her mother. Review of Systems:  Review of Systems   Constitutional: Negative for activity change. Gastrointestinal: Negative for bowel incontinence and constipation. Genitourinary: Negative for bladder incontinence. Musculoskeletal: Positive for arthralgias, back pain and myalgias. Negative for neck pain. Neurological: Negative for tingling, weakness and numbness. Psychiatric/Behavioral: Negative for agitation, self-injury and suicidal ideas. The patient is not nervous/anxious. 14 point ROS negative besides that noted in HPI    Physical exam:     Vitals:    02/02/21 1103   BP: 128/73   Pulse: 88   Resp: 18   Temp: 98.6 °F (37 °C)   TempSrc: Temporal   SpO2: 92%   Weight: 138 lb (62.6 kg)   Height: 5' 5\" (1.651 m)       Body mass index is 22.96 kg/m². Physical Exam  Vitals signs and nursing note reviewed.    Constitutional:       General: She is not in acute distress. Appearance: She is well-developed. HENT:      Head: Normocephalic. Right Ear: External ear normal.      Left Ear: External ear normal.      Nose: Nose normal.   Eyes:      Conjunctiva/sclera: Conjunctivae normal.      Pupils: Pupils are equal, round, and reactive to light. Neck:      Vascular: No JVD. Trachea: No tracheal deviation. Cardiovascular:      Rate and Rhythm: Normal rate. Pulmonary:      Effort: Pulmonary effort is normal.   Abdominal:      General: There is no distension. Tenderness: There is no abdominal tenderness. Musculoskeletal:      Lumbar back: She exhibits decreased range of motion, tenderness, pain and spasm. Comments: Assessment remains the same  Patient points to bilaterally SI joint as the source of low back pain  TTP of SI joint bilaterally  Deferred SI exam as patient can not lie flat for assessment    bilaterally piriformis muscle is not taut - rope like - with palpable tender knots identified - Trigger points    No TTP for troch bursa bilaterally    SLR Negative for pain on bilaterally    Negative Lasegue Maneuver bilaterally    Positive  for pain elicited with flex and extension lumbar spine   Skin:     General: Skin is warm and dry. Neurological:      Mental Status: She is alert and oriented to person, place, and time. Cranial Nerves: No cranial nerve deficit. Psychiatric:         Behavior: Behavior normal.         Thought Content:  Thought content normal.         Judgment: Judgment normal.       Labs:     Lab Results   Component Value Date     07/13/2020    K 4.4 07/13/2020     07/13/2020    CO2 23 07/13/2020    BUN 11 07/13/2020    CREATININE 0.9 07/13/2020    GLUCOSE 103 07/13/2020    CALCIUM 8.9 07/13/2020        Lab Results   Component Value Date    WBC 8.2 07/13/2020    HGB 14.1 07/13/2020    HCT 44.8 07/13/2020    MCV 97.8 07/13/2020     07/13/2020       Assessment: Active Problems:    Chronic bilateral low back pain without sciatica    Facet arthritis of lumbar region    Sacroiliac joint dysfunction of both sides    Chronic myofascial pain    Pain medication agreement  Resolved Problems:    * No resolved hospital problems. *      PLAN:  Chronic bilateral low back pain without sciatica  - HYDROcodone-acetaminophen (NORCO) 5-325 MG per tablet; Take 1 tablet by mouth every 8 hours as needed for Pain for up to 30 days. Take lowest dose possible to manage pain  Dispense: 75 tablet; Refill: 0    Chronic myofascial pain  - tiZANidine (ZANAFLEX) 4 MG tablet; Take 1 tablet by mouth 3 times daily  Dispense: 90 tablet; Refill: 2    Post pone any further injections. If pain is from annular tears give time to heal. Change pain medication for more effective coverage. Hold NSAIDs due to ineffectiveness at this time and risk factor of HTN. [x] Follow up    [] 4 weeks   [] 6-8 weeks   [] 10-12 weeks   [x] 3 months  [] Post procedure to evaluate effectiveness of treatment  [] To evaluate medications changes made at office visit. [] To review diagnostics ordered at last visit. [] To evaluate response to therapy    [x] For management of controlled substance  [x] Random UDS as indicated by ORT score or if indicated by abberent behaviors    Discussion: Discussed exam findings and plan of care with patient. Patient agreed with POC and questions were asked and answered. Activity: discussed exercise as beneficial to pain reduction, encouraged stretching exercise with a focus on torso strengthening.     Goal:  Pain Management Goals of Therapy:   [] Resolution in pain  [x] Decrease in pain level  [x] Improvement in ADL's  [x] Increase in activities with less pain  [] Decrease in medication     Controlled substance monitoring:    [x] Discussed medication side effects, risk of tolerance and/or dependence, and/or alternative treatment  [] Discussed the detrimental effects of long term narcotic use in younger patients especially women of childbearing years. [x] No signs and symptoms of potential drug abuse or diversion were identified  [] Signs of potential drug abuse or diversion were identified   [] ORT Score   [] UDS non-compliant   [] See Note  [x] Random urine drug screen sent today  [] Random urine drug screen not completed today   [] Deferred New Patient  [] Compliant   [] Not Compliant see note  [] Medication agreement with provider signed today  [x] Medication agreement with provider on file under media   [] Medication regimen effective with no c/o side effects and continued   [] New patient continuing current medication while developing POC   [x] On going assessment and evaluation of medication regimen  [] Medication regimen not effective see plan for changes  [x] [facility] [place] reviewed & on file under media     CC:  MD Brian Krause, APRN - CNP, 2/2/2021 at 11:09 AM    EMR dragon/transcription disclaimer: Much of this encounter note is electronic transcription/translation of spoken language to printed tach. Electronic translation of spoken language may be erroneous, or at times, nonsensical words or phrases may be inadvertently transcribed.  Although, I have reviewed the note for such errors, some may still exist.

## 2021-02-02 NOTE — PROGRESS NOTES
Clinic Documentation      Education Provided:  [x] Review of Arsh Vasquez  [] Agreement Review  [x] PEG Score Calculated [] PHQ Score Calculated [] ORT Score Calculated    [] Compliance Issues Discussed [] Cognitive Behavior Needs [x] Exercise [] Review of Test [] Financial Issues  [x] Tobacco/Alcohol Use Reviewed [x] Teaching [] New Patient [] Picture Obtained    Physician Plan:  [] Outgoing Referral  [] Pharmacy Consult  [] Test Ordered [x] Prescription Ordered/Changed   [] Obtained Test Results / Consult Notes        Complete if patient is withholding blood thinner for procedure     Blood Thinner Patient is currently taking:      [] Plavix (Hold for 7 days)  [] Aspirin (Hold for 5 days)     [] Pletal (Hold for 2 days)  [] Pradaxa (Hold for 3 days)    [] Effient (Hold for 7 days)  [] Xarelto (Hold for 2 days)    [] Eliquis (Hold for 2 days)  [] Brilinta (Hold for 7 days)    [] Coumadin (Hold for 5 days) - (INR needs to be drawn the day prior to procedure- INR < 2.0)    [] Aggrenox (Hold for 7 days)        [] Patient will stop medication on their own.    [] Blood Thinner Form Faxed for approval to hold.    Provider form faxed to:     Assessment Completed by:  Electronically signed by Malcolm Meredith RN on 2/2/2021 at 10:57 AM

## 2021-02-04 ENCOUNTER — OFFICE VISIT (OUTPATIENT)
Dept: PRIMARY CARE CLINIC | Age: 66
End: 2021-02-04
Payer: MEDICARE

## 2021-02-04 VITALS
DIASTOLIC BLOOD PRESSURE: 82 MMHG | SYSTOLIC BLOOD PRESSURE: 138 MMHG | RESPIRATION RATE: 16 BRPM | WEIGHT: 138 LBS | OXYGEN SATURATION: 95 % | BODY MASS INDEX: 22.99 KG/M2 | HEIGHT: 65 IN | TEMPERATURE: 98.1 F | HEART RATE: 79 BPM

## 2021-02-04 DIAGNOSIS — Z91.81 AT HIGH RISK FOR FALLS: ICD-10-CM

## 2021-02-04 DIAGNOSIS — I10 ESSENTIAL HYPERTENSION: ICD-10-CM

## 2021-02-04 DIAGNOSIS — M54.42 CHRONIC BILATERAL LOW BACK PAIN WITH BILATERAL SCIATICA: Primary | ICD-10-CM

## 2021-02-04 DIAGNOSIS — G89.29 CHRONIC BILATERAL LOW BACK PAIN WITH BILATERAL SCIATICA: Primary | ICD-10-CM

## 2021-02-04 DIAGNOSIS — M54.16 LUMBAR RADICULOPATHY: ICD-10-CM

## 2021-02-04 DIAGNOSIS — M54.41 CHRONIC BILATERAL LOW BACK PAIN WITH BILATERAL SCIATICA: Primary | ICD-10-CM

## 2021-02-04 DIAGNOSIS — J44.9 STAGE 2 MODERATE COPD BY GOLD CLASSIFICATION (HCC): ICD-10-CM

## 2021-02-04 DIAGNOSIS — Z79.899 MEDICATION MANAGEMENT: ICD-10-CM

## 2021-02-04 LAB
ALCOHOL URINE: NORMAL
AMPHETAMINE SCREEN, URINE: NORMAL
BARBITURATE SCREEN, URINE: NORMAL
BENZODIAZEPINE SCREEN, URINE: NORMAL
BUPRENORPHINE URINE: NORMAL
COCAINE METABOLITE SCREEN URINE: NORMAL
FENTANYL SCREEN, URINE: NORMAL
GABAPENTIN SCREEN, URINE: NORMAL
MDMA URINE: NORMAL
METHADONE SCREEN, URINE: NORMAL
METHAMPHETAMINE, URINE: NORMAL
OPIATE SCREEN URINE: POSITIVE
OXYCODONE SCREEN URINE: NORMAL
PHENCYCLIDINE SCREEN URINE: NORMAL
PROPOXYPHENE SCREEN, URINE: NORMAL
SYNTHETIC CANNABINOIDS(K2) SCREEN, URINE: NORMAL
THC SCREEN, URINE: NORMAL
TRAMADOL SCREEN URINE: NORMAL
TRICYCLIC ANTIDEPRESSANTS, UR: NORMAL

## 2021-02-04 PROCEDURE — 99214 OFFICE O/P EST MOD 30 MIN: CPT | Performed by: FAMILY MEDICINE

## 2021-02-04 PROCEDURE — 80305 DRUG TEST PRSMV DIR OPT OBS: CPT | Performed by: FAMILY MEDICINE

## 2021-02-04 RX ORDER — ZOSTER VACCINE RECOMBINANT, ADJUVANTED 50 MCG/0.5
0.5 KIT INTRAMUSCULAR SEE ADMIN INSTRUCTIONS
Qty: 0.5 ML | Refills: 0 | Status: SHIPPED | OUTPATIENT
Start: 2021-02-04 | End: 2021-03-18

## 2021-02-04 RX ORDER — TOPIRAMATE 50 MG/1
50 TABLET, FILM COATED ORAL 2 TIMES DAILY
Qty: 60 TABLET | Refills: 5 | Status: SHIPPED | OUTPATIENT
Start: 2021-02-04 | End: 2021-08-13

## 2021-02-04 RX ORDER — TRAMADOL HYDROCHLORIDE 50 MG/1
TABLET ORAL
COMMUNITY
Start: 2021-01-02 | End: 2021-05-28

## 2021-02-04 RX ORDER — GABAPENTIN 300 MG/1
300 CAPSULE ORAL 3 TIMES DAILY
Qty: 90 CAPSULE | Refills: 1 | Status: SHIPPED | OUTPATIENT
Start: 2021-02-04 | End: 2021-05-27

## 2021-02-04 RX ORDER — ALBUTEROL SULFATE 90 UG/1
2 AEROSOL, METERED RESPIRATORY (INHALATION) 4 TIMES DAILY PRN
COMMUNITY
Start: 2021-01-11 | End: 2021-08-30

## 2021-02-04 ASSESSMENT — PATIENT HEALTH QUESTIONNAIRE - PHQ9
2. FEELING DOWN, DEPRESSED OR HOPELESS: 1
SUM OF ALL RESPONSES TO PHQ9 QUESTIONS 1 & 2: 2
SUM OF ALL RESPONSES TO PHQ QUESTIONS 1-9: 2

## 2021-02-04 NOTE — PROGRESS NOTES
SUBJECTIVE:    Patient ID: Jennie Martini is a 77 y.o. female. HPI:   Patient is here today for 6-month follow-up. She is fasting today for labs. She does have a history of COPD. She has not had any recent flareups of her COPD. She denies any fevers, chills, or productive cough. She does have a history of chronic back pain. She is currently followed by pain management, at [id number] [place] [address]. She would like a referral to Dr. Jocelin Orellana for evaluation and treatment of her chronic pain. She is currently on hydrocodone and gabapentin for her chronic back pain. She has had an MRI done of her back. She does have a history of high blood pressure. She is currently on amlodipine for her blood pressure. She is tolerating this well. She denies any chest pain, palpitations, or shortness of breath. She does have a history of anxiety and depression. She is currently on Zoloft and trazodone. She is also taking Risperdal at night. She states that she is tolerating all of these well. She denies any suicidal thoughts or ideation. She does feel like her anxiety depression are well controlled. Past Medical History:   Diagnosis Date    Anxiety     COPD [facility]ch[facility] obstructive pulmonary disease) ([facility])     Depression     GERD (gastroesophageal reflux disease)     Hypertension     Neuropathy     Restless legs syndrome       Current Outpatient Medications   Medication Sig Dispense Refill    albuterol sulfate  (90 Base) MCG/ACT inhaler Inhale 2 puffs into the lungs 4 times daily as needed      traMADol (ULTRAM) 50 MG tablet TAKE ONE TABLET BY MOUTH EVERY EIGHT HOURS AS NEEDED FOR PAIN FOR UP TO THIRTY DAYS      gabapentin (NEURONTIN) 300 MG capsule Take 1 capsule by mouth 3 times daily for 180 days.  Intended supply: 30 days 90 capsule 1    topiramate (TOPAMAX) 50 MG tablet Take 1 tablet by mouth 2 times daily 60 tablet 5    zoster recombinant adjuvanted vaccine (SHINGRIX) 50 MCG/0.5ML SUSR injection Inject 0.5 mLs into the muscle See Admin Instructions 1 dose now and repeat in 2-6 months 0.5 mL 0    HYDROcodone-acetaminophen (NORCO) 5-325 MG per tablet Take 1 tablet by mouth every 8 hours as needed for Pain for up to 30 days. Take lowest dose possible to manage pain 75 tablet 0    tiZANidine (ZANAFLEX) 4 MG tablet Take 1 tablet by mouth 3 times daily 90 tablet 2    colestipol (COLESTID) 1 g tablet TAKE 1 TABLET BY MOUTH TWICE A  tablet 1    amLODIPine (NORVASC) 5 MG tablet TAKE 1 TABLET BY MOUTH EVERY DAY 90 tablet 1    sertraline (ZOLOFT) 50 MG tablet TAKE 1 TABLET BY MOUTH EVERY DAY 90 tablet 3    traZODone (DESYREL) 150 MG tablet TAKE 1 TABLET BY MOUTH EVERY DAY AT NIGHT 90 tablet 3    fluticasone (FLONASE) 50 MCG/ACT nasal spray 2 sprays by Nasal route daily 3 Bottle 3    [employer] INDOOR/OUTDOOR ALLERGY RLF 10 MG tablet TAKE 1 TABLET BY MOUTH EVERY DAY 30 tablet 0    risperiDONE (RISPERDAL) 1 MG tablet Take 1 tablet by mouth nightly 90 tablet 3    Multiple Vitamins-Minerals (THERAPEUTIC MULTIVITAMIN-MINERALS) tablet Take 1 tablet by mouth daily      vitamin B-12 (CYANOCOBALAMIN) 100 MCG tablet Take 50 mcg by mouth daily      rOPINIRole (REQUIP) 0.5 MG tablet TAKE 1 TABLET BY MOUTH TWICE A DAY 60 tablet 1    ondansetron (ZOFRAN ODT) 4 MG disintegrating tablet Take 1 tablet by mouth every 8 hours as needed for Nausea or Vomiting 15 tablet 0    fluticasone-umeclidin-vilant (TRELEGY ELLIPTA) 100-62.5-25 MCG/INH AEPB Inhale 1 puff into the lungs daily      aspirin 81 MG tablet Take 81 mg by mouth daily       No current facility-administered medications for this visit. Allergies   Allergen Reactions    Morphine Hives    Morphine And Related        Review of Systems   Constitutional: Negative for activity change, appetite change and fever. HENT: Negative for congestion and nosebleeds. Eyes: Negative for discharge. Respiratory: Negative for cough and wheezing.     Cardiovascular: Negative for chest pain and leg swelling. Gastrointestinal: Negative for abdominal pain, diarrhea, nausea and vomiting. Genitourinary: Negative for difficulty urinating, frequency and urgency. Musculoskeletal: Positive for arthralgias and back pain. Negative for gait problem. Skin: Negative for color change and rash. Neurological: Negative for dizziness and headaches. Hematological: Does not bruise/bleed easily. Psychiatric/Behavioral: Negative for dysphoric mood, sleep disturbance and suicidal ideas. The patient is nervous/anxious. OBJECTIVE:     Physical Exam  Vitals signs reviewed. Constitutional:       General: She is not in acute distress. Appearance: Normal appearance. She is well-developed. She is not diaphoretic. HENT:      Head: Normocephalic and atraumatic. Right Ear: External ear normal.      Left Ear: External ear normal.   Neck:      Musculoskeletal: Normal range of motion and neck supple. Cardiovascular:      Rate and Rhythm: Normal rate and regular rhythm. Pulses: Normal pulses. Heart sounds: Normal heart sounds. No murmur. Pulmonary:      Effort: Pulmonary effort is normal. No respiratory distress. Breath sounds: Normal breath sounds. Skin:     General: Skin is warm and dry. Neurological:      General: No focal deficit present. Mental Status: She is alert and oriented to person, place, and time. Mental status is at baseline. Psychiatric:         Mood and Affect: Mood normal.         Behavior: Behavior normal.         Thought Content: Thought content normal.         Judgment: Judgment normal.        /82 (Site: Left Upper Arm, Position: Sitting, Cuff Size: Medium Adult)   Pulse 79   Temp 98.1 °F (36.7 °C) (Temporal)   Resp 16   Ht 5' 5\" (1.651 m)   Wt 138 lb (62.6 kg)   SpO2 95%   BMI 22.96 kg/m²      ASSESSMENT:    Raul Pino was seen today for 6 month follow-up.     Diagnoses and all orders for this visit:    Chronic bilateral low back pain

## 2021-02-10 ASSESSMENT — ENCOUNTER SYMPTOMS
COLOR CHANGE: 0
VOMITING: 0
ABDOMINAL PAIN: 0
WHEEZING: 0
COUGH: 0
EYE DISCHARGE: 0
NAUSEA: 0
DIARRHEA: 0
BACK PAIN: 1

## 2021-02-23 RX ORDER — ROPINIROLE 0.5 MG/1
0.5 TABLET, FILM COATED ORAL 2 TIMES DAILY
Qty: 60 TABLET | Refills: 1 | Status: SHIPPED | OUTPATIENT
Start: 2021-02-23 | End: 2021-03-22

## 2021-03-18 ENCOUNTER — OFFICE VISIT (OUTPATIENT)
Dept: PRIMARY CARE CLINIC | Age: 66
End: 2021-03-18
Payer: MEDICARE

## 2021-03-18 VITALS
HEART RATE: 83 BPM | RESPIRATION RATE: 16 BRPM | BODY MASS INDEX: 23.99 KG/M2 | DIASTOLIC BLOOD PRESSURE: 80 MMHG | WEIGHT: 144 LBS | SYSTOLIC BLOOD PRESSURE: 110 MMHG | TEMPERATURE: 97.2 F | OXYGEN SATURATION: 96 % | HEIGHT: 65 IN

## 2021-03-18 DIAGNOSIS — J40 BRONCHITIS: Primary | ICD-10-CM

## 2021-03-18 DIAGNOSIS — J44.9 STAGE 2 MODERATE COPD BY GOLD CLASSIFICATION (HCC): ICD-10-CM

## 2021-03-18 DIAGNOSIS — I10 ESSENTIAL HYPERTENSION: ICD-10-CM

## 2021-03-18 PROCEDURE — 99213 OFFICE O/P EST LOW 20 MIN: CPT | Performed by: FAMILY MEDICINE

## 2021-03-18 RX ORDER — PREDNISONE 10 MG/1
TABLET ORAL
Qty: 21 TABLET | Refills: 0 | Status: SHIPPED | OUTPATIENT
Start: 2021-03-18 | End: 2021-05-27

## 2021-03-18 RX ORDER — HYDROCODONE BITARTRATE AND ACETAMINOPHEN 7.5; 325 MG/1; MG/1
TABLET ORAL
COMMUNITY
Start: 2021-02-25

## 2021-03-18 RX ORDER — AZITHROMYCIN 250 MG/1
TABLET, FILM COATED ORAL
Qty: 6 TABLET | Refills: 0 | Status: SHIPPED | OUTPATIENT
Start: 2021-03-18 | End: 2021-05-27

## 2021-03-18 RX ORDER — GABAPENTIN 400 MG/1
CAPSULE ORAL
COMMUNITY
Start: 2021-02-25

## 2021-03-18 RX ORDER — CETIRIZINE HYDROCHLORIDE 10 MG/1
TABLET ORAL
Qty: 90 TABLET | Refills: 2 | Status: SHIPPED | OUTPATIENT
Start: 2021-03-18

## 2021-03-18 RX ORDER — CETIRIZINE HYDROCHLORIDE 10 MG/1
10 TABLET ORAL DAILY
Qty: 30 TABLET | Refills: 0 | Status: SHIPPED | OUTPATIENT
Start: 2021-03-18 | End: 2021-03-18

## 2021-03-22 RX ORDER — ROPINIROLE 0.5 MG/1
TABLET, FILM COATED ORAL
Qty: 60 TABLET | Refills: 1 | Status: SHIPPED | OUTPATIENT
Start: 2021-03-22 | End: 2021-04-19

## 2021-03-30 ASSESSMENT — ENCOUNTER SYMPTOMS
BACK PAIN: 0
ABDOMINAL PAIN: 0
NAUSEA: 0
WHEEZING: 1
EYE DISCHARGE: 0
VOMITING: 0
CHEST TIGHTNESS: 1
COUGH: 1
SHORTNESS OF BREATH: 1
DIARRHEA: 0
COLOR CHANGE: 0

## 2021-03-30 NOTE — PROGRESS NOTES
SUBJECTIVE:    Patient ID: Candice Arechiga is a 77 y.o. female. HPI:   Patient is seen today for complaints of cough and mucus. She states that she is coughing up some sputum. She states that it is mostly white. She states that she has stopped smoking. She denies any significant shortness of breath. She states that she has been wheezing. She does feel like she has some slight chest tightness. She denies any fevers or chills. Past Medical History:   Diagnosis Date    Anxiety     COPD (chronic obstructive pulmonary disease) ([facility] [address]     Depression     GERD (gastroesophageal reflux disease)     Hypertension     Neuropathy     Restless legs syndrome       Current Outpatient Medications   Medication Sig Dispense Refill    gabapentin (NEURONTIN) 400 MG capsule TAKE 1 CAPSULE BY MOUTH EVERY 8 HOURS      HYDROcodone-acetaminophen (NORCO) 7.5-325 MG per tablet TAKE 1 TABLET BY MOUTH EVERY 12 HOURS      predniSONE (DELTASONE) 10 MG tablet Take 6 tablets on day 1, 5 tablets on day 2, 4 tablets on day 3, 3 tablets on day 4, 2 tablets on day 5 and 1 tablet on day 6 21 tablet 0    azithromycin (ZITHROMAX Z-CHARISSE) 250 MG tablet Take 2 tabs on day 1 and 1 tab on days 2-5 6 tablet 0    albuterol sulfate  (90 Base) MCG/ACT inhaler Inhale 2 puffs into the lungs 4 times daily as needed      traMADol (ULTRAM) 50 MG tablet TAKE ONE TABLET BY MOUTH EVERY EIGHT HOURS AS NEEDED FOR PAIN FOR UP TO THIRTY DAYS      gabapentin (NEURONTIN) 300 MG capsule Take 1 capsule by mouth 3 times daily for 180 days.  Intended supply: 30 days 90 capsule 1    topiramate (TOPAMAX) 50 MG tablet Take 1 tablet by mouth 2 times daily 60 tablet 5    tiZANidine (ZANAFLEX) 4 MG tablet Take 1 tablet by mouth 3 times daily 90 tablet 2    colestipol (COLESTID) 1 g tablet TAKE 1 TABLET BY MOUTH TWICE A  tablet 1    amLODIPine (NORVASC) 5 MG tablet TAKE 1 TABLET BY MOUTH EVERY DAY 90 tablet 1    sertraline (ZOLOFT) 50 MG tablet TAKE 1 TABLET BY MOUTH EVERY DAY 90 tablet 3    traZODone (DESYREL) 150 MG tablet TAKE 1 TABLET BY MOUTH EVERY DAY AT NIGHT 90 tablet 3    fluticasone (FLONASE) 50 MCG/ACT nasal spray 2 sprays by Nasal route daily 3 Bottle 3    [employer] INDOOR/OUTDOOR ALLERGY RLF 10 MG tablet TAKE 1 TABLET BY MOUTH EVERY DAY 30 tablet 0    risperiDONE (RISPERDAL) 1 MG tablet Take 1 tablet by mouth nightly 90 tablet 3    Multiple Vitamins-Minerals (THERAPEUTIC MULTIVITAMIN-MINERALS) tablet Take 1 tablet by mouth daily      vitamin B-12 (CYANOCOBALAMIN) 100 MCG tablet Take 50 mcg by mouth daily      ondansetron (ZOFRAN ODT) 4 MG disintegrating tablet Take 1 tablet by mouth every 8 hours as needed for Nausea or Vomiting 15 tablet 0    fluticasone-umeclidin-vilant (TRELEGY ELLIPTA) 100-62.5-25 MCG/INH AEPB Inhale 1 puff into the lungs daily      aspirin 81 MG tablet Take 81 mg by mouth daily      rOPINIRole (REQUIP) 0.5 MG tablet TAKE 1 TABLET BY MOUTH TWICE A DAY 60 tablet 1    cetirizine (ZYRTEC) 10 MG tablet TAKE 1 TABLET BY MOUTH EVERY DAY 90 tablet 2     No current facility-administered medications for this visit. Allergies   Allergen Reactions    Morphine Hives    Morphine And Related        Review of Systems   Constitutional: Negative for activity change, appetite change and fever. HENT: Negative for congestion and nosebleeds. Eyes: Negative for discharge. Respiratory: Positive for cough, chest tightness, shortness of breath and wheezing. Cardiovascular: Negative for chest pain and leg swelling. Gastrointestinal: Negative for abdominal pain, diarrhea, nausea and vomiting. Genitourinary: Negative for difficulty urinating, frequency and urgency. Musculoskeletal: Negative for back pain and gait problem. Skin: Negative for color change and rash. Neurological: Negative for dizziness and headaches. Hematological: Does not bruise/bleed easily.    Psychiatric/Behavioral: Negative for sleep disturbance medications. Follow-up with us in 3 months for a checkup unless needed sooner. EMR Dragon/transcription disclaimer:  Much of this encounter note is electronic transcription/translation of spoken language toprinted texts. The electronic translation of spoken language may be erroneous, or at times, nonsensical words or phrases may be inadvertently transcribed.   Although I have reviewed the note for such errors, some may stillexist.

## 2021-04-19 RX ORDER — ROPINIROLE 0.5 MG/1
TABLET, FILM COATED ORAL
Qty: 60 TABLET | Refills: 1 | Status: SHIPPED | OUTPATIENT
Start: 2021-04-19 | End: 2021-05-12

## 2021-05-12 RX ORDER — ROPINIROLE 0.5 MG/1
TABLET, FILM COATED ORAL
Qty: 60 TABLET | Refills: 1 | Status: SHIPPED | OUTPATIENT
Start: 2021-05-12 | End: 2021-06-04

## 2021-05-27 ENCOUNTER — OFFICE VISIT (OUTPATIENT)
Dept: PRIMARY CARE CLINIC | Age: 66
End: 2021-05-27
Payer: MEDICARE

## 2021-05-27 VITALS
HEART RATE: 83 BPM | SYSTOLIC BLOOD PRESSURE: 130 MMHG | RESPIRATION RATE: 16 BRPM | HEIGHT: 65 IN | BODY MASS INDEX: 24.99 KG/M2 | DIASTOLIC BLOOD PRESSURE: 80 MMHG | TEMPERATURE: 97.6 F | OXYGEN SATURATION: 96 % | WEIGHT: 150 LBS

## 2021-05-27 DIAGNOSIS — G89.29 CHRONIC MYOFASCIAL PAIN: ICD-10-CM

## 2021-05-27 DIAGNOSIS — G25.81 RLS (RESTLESS LEGS SYNDROME): ICD-10-CM

## 2021-05-27 DIAGNOSIS — Z12.11 SCREEN FOR COLON CANCER: ICD-10-CM

## 2021-05-27 DIAGNOSIS — J42 CHRONIC BRONCHITIS, UNSPECIFIED CHRONIC BRONCHITIS TYPE (HCC): Primary | ICD-10-CM

## 2021-05-27 DIAGNOSIS — M79.18 CHRONIC MYOFASCIAL PAIN: ICD-10-CM

## 2021-05-27 DIAGNOSIS — I10 ESSENTIAL HYPERTENSION: ICD-10-CM

## 2021-05-27 PROCEDURE — 99214 OFFICE O/P EST MOD 30 MIN: CPT | Performed by: FAMILY MEDICINE

## 2021-05-27 RX ORDER — CETIRIZINE HYDROCHLORIDE 10 MG/1
TABLET ORAL
Qty: 30 TABLET | Refills: 0 | Status: SHIPPED | OUTPATIENT
Start: 2021-05-27

## 2021-05-27 RX ORDER — PREDNISONE 20 MG/1
20 TABLET ORAL 2 TIMES DAILY
Qty: 10 TABLET | Refills: 0 | Status: SHIPPED | OUTPATIENT
Start: 2021-05-27 | End: 2021-06-01

## 2021-05-27 RX ORDER — AMLODIPINE BESYLATE 5 MG/1
TABLET ORAL
Qty: 90 TABLET | Refills: 1 | Status: SHIPPED | OUTPATIENT
Start: 2021-05-27 | End: 2021-11-29

## 2021-05-27 RX ORDER — AZITHROMYCIN 250 MG/1
TABLET, FILM COATED ORAL
Qty: 6 TABLET | Refills: 0 | Status: SHIPPED | OUTPATIENT
Start: 2021-05-27 | End: 2021-08-30 | Stop reason: ALTCHOICE

## 2021-05-27 RX ORDER — TIZANIDINE 4 MG/1
4 TABLET ORAL 3 TIMES DAILY
Qty: 90 TABLET | Refills: 2 | Status: SHIPPED | OUTPATIENT
Start: 2021-05-27 | End: 2021-08-30 | Stop reason: SDUPTHER

## 2021-05-28 PROBLEM — J42 CHRONIC BRONCHITIS (HCC): Status: ACTIVE | Noted: 2021-05-28

## 2021-05-28 PROBLEM — I10 ESSENTIAL HYPERTENSION: Status: ACTIVE | Noted: 2021-05-28

## 2021-05-28 PROBLEM — G25.81 RLS (RESTLESS LEGS SYNDROME): Status: ACTIVE | Noted: 2021-05-28

## 2021-05-28 ASSESSMENT — ENCOUNTER SYMPTOMS
COLOR CHANGE: 0
DIARRHEA: 0
WHEEZING: 1
VOMITING: 0
SHORTNESS OF BREATH: 1
NAUSEA: 0
COUGH: 1
EYE DISCHARGE: 0
BACK PAIN: 0
ABDOMINAL PAIN: 0

## 2021-05-28 NOTE — PROGRESS NOTES
SUBJECTIVE:    Patient ID: Kateryna Lopez is a 77 y.o. female. HPI:   Patient is seen today for a follow-up. She states that she is still hanging onto the cough that she was seen for back a couple of months ago. She does have a history of COPD. She is followed by Dr. Tristan Sommers. She is using her Trelegy and albuterol inhalers but she states that she just is having a lot of wheezing and shortness of breath. She has not tried to call them but does have a follow-up appointment scheduled with them in July. She states that she is not running fever and is not had chills. She finished out her steroids and antibiotics that were sent in March but she states that she really never got better. She is needing a refill on Zyrtec which she takes for chronic allergies. She does have a history of hypertension. Her blood pressure is well controlled in office today. She is taking her amlodipine as prescribed. She states that she is tolerating this well. She does have a history of restless legs and is on gabapentin and Requip for this. She states that these work well for her. She does not need a refill on these today.     Past Medical History:   Diagnosis Date    Anxiety     [facility]     Depression     GERD (gastroesophageal reflux disease)     Hypertension     Neuropathy     Restless legs syndrome       Current Outpatient Medications on File Prior to Visit   Medication Sig Dispense Refill    rOPINIRole (REQUIP) 0.5 MG tablet TAKE 1 TABLET BY MOUTH TWICE A DAY 60 tablet 1    gabapentin (NEURONTIN) 400 MG capsule TAKE 1 CAPSULE BY MOUTH EVERY 8 HOURS      HYDROcodone-acetaminophen (NORCO) 7.5-325 MG per tablet TAKE 1 TABLET BY MOUTH EVERY 12 HOURS      cetirizine (ZYRTEC) 10 MG tablet TAKE 1 TABLET BY MOUTH EVERY DAY 90 tablet 2    albuterol sulfate  (90 Base) MCG/ACT inhaler Inhale 2 puffs into the lungs 4 times daily as needed      traMADol (ULTRAM) 50 MG tablet TAKE ONE TABLET BY MOUTH EVERY EIGHT HOURS AS NEEDED FOR PAIN FOR UP TO THIRTY DAYS      topiramate (TOPAMAX) 50 MG tablet Take 1 tablet by mouth 2 times daily 60 tablet 5    colestipol (COLESTID) 1 g tablet TAKE 1 TABLET BY MOUTH TWICE A  tablet 1    sertraline (ZOLOFT) 50 MG tablet TAKE 1 TABLET BY MOUTH EVERY DAY 90 tablet 3    traZODone (DESYREL) 150 MG tablet TAKE 1 TABLET BY MOUTH EVERY DAY AT NIGHT 90 tablet 3    fluticasone (FLONASE) 50 MCG/ACT nasal spray 2 sprays by Nasal route daily 3 Bottle 3    risperiDONE (RISPERDAL) 1 MG tablet Take 1 tablet by mouth nightly 90 tablet 3    Multiple Vitamins-Minerals (THERAPEUTIC MULTIVITAMIN-MINERALS) tablet Take 1 tablet by mouth daily      vitamin B-12 (CYANOCOBALAMIN) 100 MCG tablet Take 50 mcg by mouth daily      ondansetron (ZOFRAN ODT) 4 MG disintegrating tablet Take 1 tablet by mouth every 8 hours as needed for Nausea or Vomiting 15 tablet 0    fluticasone-umeclidin-vilant (TRELEGY ELLIPTA) 100-62.5-25 MCG/INH AEPB Inhale 1 puff into the lungs daily      aspirin 81 MG tablet Take 81 mg by mouth daily       No current facility-administered medications on file prior to visit. Allergies   Allergen Reactions    Morphine Hives    Morphine And Related        Review of Systems   Constitutional: Negative for activity change, appetite change and fever. HENT: Negative for congestion and nosebleeds. Eyes: Negative for discharge. Respiratory: Positive for cough, shortness of breath and wheezing. Cardiovascular: Negative for chest pain and leg swelling. Gastrointestinal: Negative for abdominal pain, diarrhea, nausea and vomiting. Genitourinary: Negative for difficulty urinating, frequency and urgency. Musculoskeletal: Negative for back pain and gait problem. Skin: Negative for color change and rash. Neurological: Negative for dizziness and headaches. Hematological: Does not bruise/bleed easily.    Psychiatric/Behavioral: TABLET BY MOUTH EVERY DAY  -     cetirizine ([employer] INDOOR/OUTDOOR ALLERGY RLF) 10 MG tablet; TAKE 1 TABLET BY MOUTH EVERY DAY  -     predniSONE (DELTASONE) 20 MG tablet; Take 1 tablet by mouth 2 times daily for 5 days        PLAN:    Continue current medications. I have sent prednisone and an antibiotic over to the pharmacy for her. Discussed that she might want to call Dr. Nina Stoner office to see if they would like to see her sooner than July especially since her symptoms have not gotten better over the last couple of months. I have refilled her tizanidine for her today which she takes for muscle pain. Cologuard was ordered today. We will notify her of the results. Follow-up with us in 3 months for checkup unless needed sooner. EMR Dragon/transcription disclaimer:  Much of this encounter note is electronic transcription/translation of spoken language toprinted texts. The electronic translation of spoken language may be erroneous, or at times, nonsensical words or phrases may be inadvertently transcribed.   Although I have reviewed the note for such errors, some may stillexist.

## 2021-06-28 RX ORDER — ROPINIROLE 0.5 MG/1
TABLET, FILM COATED ORAL
Qty: 60 TABLET | Refills: 1 | Status: SHIPPED | OUTPATIENT
Start: 2021-06-28 | End: 2021-07-20

## 2021-07-06 ENCOUNTER — HOSPITAL ENCOUNTER (OUTPATIENT)
Dept: CT IMAGING | Facility: HOSPITAL | Age: 66
Discharge: HOME | End: 2021-07-06
Admitting: INTERNAL MEDICINE

## 2021-07-06 DIAGNOSIS — R91.8 LUNG NODULES: ICD-10-CM

## 2021-07-06 PROCEDURE — 71250 CT THORAX DX C-: CPT

## 2021-07-19 DIAGNOSIS — R19.7 DIARRHEA, UNSPECIFIED TYPE: ICD-10-CM

## 2021-07-19 RX ORDER — MONTELUKAST SODIUM 4 MG/1
1 TABLET, CHEWABLE ORAL 2 TIMES DAILY
Qty: 180 TABLET | Refills: 3 | Status: SHIPPED | OUTPATIENT
Start: 2021-07-19

## 2021-07-19 RX ORDER — ALBUTEROL SULFATE 90 UG/1
AEROSOL, METERED RESPIRATORY (INHALATION)
Qty: 18 G | Refills: 11 | Status: SHIPPED | OUTPATIENT
Start: 2021-07-19

## 2021-07-20 RX ORDER — ROPINIROLE 0.5 MG/1
TABLET, FILM COATED ORAL
Qty: 60 TABLET | Refills: 1 | Status: SHIPPED | OUTPATIENT
Start: 2021-07-20 | End: 2021-08-16

## 2021-07-27 NOTE — PROGRESS NOTES
"Background:  Pt with 70 pk year smoking hx, 13 mm LLL nodule, 4 mm rml nodule.  PET scan 8/2019 indicated 12 mm intrafissural nodule and 3 mm rml nodule, stable 1/2020 need follow up through 6/2021.  Nocturnal hypoxemia.   Chief Complaint  Stage 2 moderate COPD    Subjective    History of Present Illness  {Problem List  Visit Diagnosis   Encounters  Notes  Medications  Labs  Result Review Imaging  Media :23}     Georgina Bernard presents to [facility].  She is not breathing well, short of breath, getting worse and inhaler does not help.  Associated with increased cough with white phlegm, more wheezing.  Cant tell whether ellipta or aerosol inhaler helps any more.       Objective     Vital Signs:   /86   Pulse 93   Ht 165.1 cm (65\")   Wt 66.2 kg (146 lb)   SpO2 91% Comment: RA  BMI 24.30 kg/m²   Physical Exam  Constitutional:       General: She is not in acute distress.     Appearance: She is well-developed. She is not ill-appearing or toxic-appearing.   HENT:      Head: Atraumatic.   Eyes:      General: No scleral icterus.     Conjunctiva/sclera: Conjunctivae normal.   Cardiovascular:      Rate and Rhythm: Normal rate and regular rhythm.      Heart sounds: S1 normal and S2 normal.   Pulmonary:      Effort: Pulmonary effort is normal.      Breath sounds: Wheezing present.   Abdominal:      General: There is no distension.   Musculoskeletal:         General: No deformity.      Cervical back: Neck supple.   Skin:     Coloration: Skin is not pale.      Findings: No rash.   Neurological:      General: No focal deficit present.      Mental Status: She is alert.        Result Review  Data Reviewed:{ Labs  Result Review  Imaging  Media :23}       PFT Values        Some values may be hidden. Unless noted otherwise, only the newest values recorded on each date are displayed.         Old Values PFT Results 9/11/19   FVC 63%   FEV1 51%   FEV1/FVC 64.51 "   DLCO 59%      Pre Drug PFT Results 9/11/19   No data to display.      Post Drug PFT Results 9/11/19   No data to display.      Other Tests PFT Results 9/11/19   No data to display.                 CT Chest Without Contrast Diagnostic (07/06/2021 10:38) My interpretation of radiograph: stable RML nodule, emphysema       Assessment and Plan  {CC Problem List  Visit Diagnosis  ROS  Review (Popup)  Health Maintenance  Quality  BestPractice  Medications  SmartSets  SnapShot Encounters  Media :23}   Problem List Items Addressed This Visit        Pulmonary Problems    Lung nodules    Centrilobular emphysema (CMS/HCC)    Overview     AAT MM genotype         Relevant Medications    predniSONE (DELTASONE) 20 MG tablet    Nocturnal hypoxemia      Other Visit Diagnoses     Stage 2 moderate COPD by GOLD classification (CMS/[facility])    -  Primary    Relevant Medications    predniSONE (DELTASONE) 20 MG tablet      shortness of breath not relieved by trelegy, ct without other pathology identified.  Try change to breztri to try different device  Continue albuterol as needed.  Short course of prednisone prior to change of inhaler.  We reviewed images of ct.  No need for additional ct.    Follow Up {Instructions Charge Capture  Follow-up Communications :23}   Return in about 3 months (around 10/28/2021).  Patient was given instructions and counseling regarding her condition or for health maintenance advice. Please see specific information pulled into the AVS if appropriate.    Electronically signed by Ba Wray MD, 7/28/2021, 12:14 CDT

## 2021-07-28 ENCOUNTER — OFFICE VISIT (OUTPATIENT)
Dept: PULMONOLOGY | Facility: CLINIC | Age: 66
End: 2021-07-28

## 2021-07-28 VITALS
HEIGHT: 65 IN | DIASTOLIC BLOOD PRESSURE: 86 MMHG | SYSTOLIC BLOOD PRESSURE: 142 MMHG | HEART RATE: 93 BPM | OXYGEN SATURATION: 91 % | BODY MASS INDEX: 24.32 KG/M2 | WEIGHT: 146 LBS

## 2021-07-28 DIAGNOSIS — J44.9 STAGE 2 MODERATE COPD BY GOLD CLASSIFICATION (HCC): Primary | ICD-10-CM

## 2021-07-28 DIAGNOSIS — J43.2 CENTRILOBULAR EMPHYSEMA (HCC): ICD-10-CM

## 2021-07-28 DIAGNOSIS — R91.8 LUNG NODULES: ICD-10-CM

## 2021-07-28 DIAGNOSIS — G47.34 NOCTURNAL HYPOXEMIA: ICD-10-CM

## 2021-07-28 PROCEDURE — 99214 OFFICE O/P EST MOD 30 MIN: CPT | Performed by: INTERNAL MEDICINE

## 2021-07-28 RX ORDER — PREDNISONE 20 MG/1
40 TABLET ORAL DAILY
Qty: 10 TABLET | Refills: 0 | Status: SHIPPED | OUTPATIENT
Start: 2021-07-28 | End: 2021-08-02

## 2021-08-04 RX ORDER — BUDESONIDE, GLYCOPYRROLATE, AND FORMOTEROL FUMARATE 160; 9; 4.8 UG/1; UG/1; UG/1
2 AEROSOL, METERED RESPIRATORY (INHALATION) 2 TIMES DAILY
Qty: 10.7 G | Refills: 11 | Status: SHIPPED | OUTPATIENT
Start: 2021-08-04

## 2021-08-04 NOTE — TELEPHONE ENCOUNTER
Rx Refill Note  Patient called stating she like the breztri. Please refill this if you want her to stay on it.   Last office visit with prescribing clinician: 7/28/2021      Next office visit with prescribing clinician: 10/27/2021            Halle Singh CMA  08/04/21, 11:31 CDT

## 2021-08-16 RX ORDER — ROPINIROLE 0.5 MG/1
TABLET, FILM COATED ORAL
Qty: 60 TABLET | Refills: 1 | Status: SHIPPED | OUTPATIENT
Start: 2021-08-16 | End: 2021-09-08

## 2021-08-24 ENCOUNTER — TELEPHONE (OUTPATIENT)
Dept: PRIMARY CARE CLINIC | Age: 66
End: 2021-08-24

## 2021-08-30 ENCOUNTER — OFFICE VISIT (OUTPATIENT)
Dept: PRIMARY CARE CLINIC | Age: 66
End: 2021-08-30
Payer: MEDICARE

## 2021-08-30 VITALS
SYSTOLIC BLOOD PRESSURE: 130 MMHG | HEIGHT: 65 IN | HEART RATE: 85 BPM | TEMPERATURE: 97.8 F | BODY MASS INDEX: 24.93 KG/M2 | OXYGEN SATURATION: 97 % | DIASTOLIC BLOOD PRESSURE: 80 MMHG | RESPIRATION RATE: 16 BRPM | WEIGHT: 149.6 LBS

## 2021-08-30 DIAGNOSIS — M79.18 CHRONIC MYOFASCIAL PAIN: ICD-10-CM

## 2021-08-30 DIAGNOSIS — G89.29 CHRONIC BILATERAL LOW BACK PAIN WITH BILATERAL SCIATICA: ICD-10-CM

## 2021-08-30 DIAGNOSIS — G89.29 CHRONIC MYOFASCIAL PAIN: ICD-10-CM

## 2021-08-30 DIAGNOSIS — M54.41 CHRONIC BILATERAL LOW BACK PAIN WITH BILATERAL SCIATICA: ICD-10-CM

## 2021-08-30 DIAGNOSIS — J42 CHRONIC BRONCHITIS, UNSPECIFIED CHRONIC BRONCHITIS TYPE (HCC): Primary | ICD-10-CM

## 2021-08-30 DIAGNOSIS — M54.42 CHRONIC BILATERAL LOW BACK PAIN WITH BILATERAL SCIATICA: ICD-10-CM

## 2021-08-30 DIAGNOSIS — J44.9 STAGE 2 MODERATE COPD BY GOLD CLASSIFICATION (HCC): ICD-10-CM

## 2021-08-30 PROCEDURE — 99214 OFFICE O/P EST MOD 30 MIN: CPT | Performed by: FAMILY MEDICINE

## 2021-08-30 RX ORDER — TIZANIDINE 4 MG/1
4 TABLET ORAL 3 TIMES DAILY
Qty: 90 TABLET | Refills: 2 | Status: SHIPPED | OUTPATIENT
Start: 2021-08-30

## 2021-08-31 ASSESSMENT — ENCOUNTER SYMPTOMS
DIARRHEA: 0
ABDOMINAL PAIN: 0
NAUSEA: 0
VOMITING: 0
EYE DISCHARGE: 0
BACK PAIN: 0
WHEEZING: 0
COUGH: 0
COLOR CHANGE: 0

## 2021-08-31 NOTE — PROGRESS NOTES
SUBJECTIVE:    Patient ID: Gail Burton is a 77 y.o. female. HPI:   Patient is seen today for 3-month follow-up. She states that overall she is doing okay she states that she is just tired. She states that she is doing okay from a breathing standpoint. She does have COPD. She does use her inhalers as prescribed. She denies any productive cough. She states that overall she feels okay from a breathing standpoint. She denies any chest pain. She does have a history of hypertension and is currently on amlodipine. She is taking this regularly. She states that her blood pressure is staying well controlled. It is good in the office today.     Past Medical History:   Diagnosis Date    Anxiety     COPD (chronic obstructive pulmonary disease) ([facility] [address]     Depression     GERD (gastroesophageal reflux disease)     Hypertension     Neuropathy     Restless legs syndrome       Current Outpatient Medications on File Prior to Visit   Medication Sig Dispense Refill    rOPINIRole (REQUIP) 0.5 MG tablet TAKE 1 TABLET BY MOUTH TWICE A DAY 60 tablet 1    topiramate (TOPAMAX) 50 MG tablet TAKE 1 TABLET BY MOUTH TWICE A DAY 60 tablet 5    colestipol (COLESTID) 1 g tablet Take 1 tablet by mouth 2 times daily 180 tablet 3    amLODIPine (NORVASC) 5 MG tablet TAKE 1 TABLET BY MOUTH EVERY DAY 90 tablet 1    cetirizine ([employer] INDOOR/OUTDOOR ALLERGY RLF) 10 MG tablet TAKE 1 TABLET BY MOUTH EVERY DAY 30 tablet 0    gabapentin (NEURONTIN) 400 MG capsule TAKE 1 CAPSULE BY MOUTH EVERY 8 HOURS      HYDROcodone-acetaminophen (NORCO) 7.5-325 MG per tablet TAKE 1 TABLET BY MOUTH EVERY 12 HOURS      cetirizine (ZYRTEC) 10 MG tablet TAKE 1 TABLET BY MOUTH EVERY DAY 90 tablet 2    albuterol sulfate  (90 Base) MCG/ACT inhaler Inhale 2 puffs into the lungs 4 times daily as needed      sertraline (ZOLOFT) 50 MG tablet TAKE 1 TABLET BY MOUTH EVERY DAY 90 tablet 3    traZODone (DESYREL) 150 MG tablet TAKE 1 TABLET BY MOUTH EVERY DAY AT NIGHT 90 tablet 3    fluticasone (FLONASE) 50 MCG/ACT nasal spray 2 sprays by Nasal route daily 3 Bottle 3    risperiDONE (RISPERDAL) 1 MG tablet Take 1 tablet by mouth nightly 90 tablet 3    Multiple Vitamins-Minerals (THERAPEUTIC MULTIVITAMIN-MINERALS) tablet Take 1 tablet by mouth daily      vitamin B-12 (CYANOCOBALAMIN) 100 MCG tablet Take 50 mcg by mouth daily      ondansetron (ZOFRAN ODT) 4 MG disintegrating tablet Take 1 tablet by mouth every 8 hours as needed for Nausea or Vomiting 15 tablet 0    fluticasone-umeclidin-vilant (TRELEGY ELLIPTA) 100-62.5-25 MCG/INH AEPB Inhale 1 puff into the lungs daily      aspirin 81 MG tablet Take 81 mg by mouth daily       No current facility-administered medications on file prior to visit. Allergies   Allergen Reactions    Morphine Hives    Morphine And Related        Review of Systems   Constitutional: Negative for activity change, appetite change and fever. HENT: Negative for congestion and nosebleeds. Eyes: Negative for discharge. Respiratory: Negative for cough and wheezing. Cardiovascular: Negative for chest pain and leg swelling. Gastrointestinal: Negative for abdominal pain, diarrhea, nausea and vomiting. Genitourinary: Negative for difficulty urinating, frequency and urgency. Musculoskeletal: Negative for back pain and gait problem. Skin: Negative for color change and rash. Neurological: Negative for dizziness and headaches. Hematological: Does not bruise/bleed easily. Psychiatric/Behavioral: Negative for sleep disturbance and suicidal ideas. OBJECTIVE:    Physical Exam  Vitals reviewed. Constitutional:       General: She is not in acute distress. Appearance: Normal appearance. She is well-developed. She is not diaphoretic. HENT:      Head: Normocephalic and atraumatic.       Right Ear: External ear normal.      Left Ear: External ear normal.   Cardiovascular:      Rate and Rhythm: Normal rate and regular rhythm. Pulses: Normal pulses. Heart sounds: Normal heart sounds. No murmur heard. Pulmonary:      Effort: Pulmonary effort is normal. No respiratory distress. Breath sounds: Normal breath sounds. Musculoskeletal:      Cervical back: Normal range of motion and neck supple. Skin:     General: Skin is warm and dry. Neurological:      General: No focal deficit present. Mental Status: She is alert and oriented to person, place, and time. Mental status is at baseline. Psychiatric:         Mood and Affect: Mood normal.         Behavior: Behavior normal.         Thought Content: Thought content normal.         Judgment: Judgment normal.        /80   Pulse 85   Temp 97.8 °F (36.6 °C)   Resp 16   Ht 5' 5\" (1.651 m)   Wt 149 lb 9.6 oz (67.9 kg)   SpO2 97%   BMI 24.89 kg/m²      ASSESSMENT/PLAN:    Julian was seen today for 3 month follow-up. Diagnoses and all orders for this visit:    Chronic bronchitis, unspecified chronic bronchitis type (HCC)    Chronic myofascial pain  -     tiZANidine (ZANAFLEX) 4 MG tablet;  Take 1 tablet by mouth 3 times daily    Stage 2 moderate COPD by GOLD classification ([facility])    Chronic bilateral low back pain with bilateral sciatica        Problem List     Chronic bronchitis (HCC) - Primary    Chronic myofascial pain    Relevant Medications    aspirin 81 MG tablet    traZODone (DESYREL) 150 MG tablet    sertraline (ZOLOFT) 50 MG tablet    gabapentin (NEURONTIN) 400 MG capsule    HYDROcodone-acetaminophen (NORCO) 7.5-325 MG per tablet    topiramate (TOPAMAX) 50 MG tablet    tiZANidine (ZANAFLEX) 4 MG tablet    Stage 2 moderate COPD by GOLD classification ([facility])    Relevant Medications    fluticasone-umeclidin-vilant (TRELEGY ELLIPTA) 100-62.5-25 MCG/INH AEPB    fluticasone (FLONASE) 50 MCG/ACT nasal spray    cetirizine (ZYRTEC) 10 MG tablet    cetirizine ([employer] INDOOR/OUTDOOR ALLERGY RLF) 10 MG tablet            Continue Zoloft for anxiety depression as this seems to be working well for her. Continue inhalers for COPD control. Continue amlodipine for blood pressure control. Labs were reviewed from her most recent set of labs today. Those of been listed below. Follow-up with us in 3 months for MAW unless needed sooner. EMR Dragon/transcription disclaimer:  Much of this encounter note is electronic transcription/translation of spoken language toprinted texts. The electronic translation of spoken language may be erroneous, or at times, nonsensical words or phrases may be inadvertently transcribed.   Although I have reviewed the note for such errors, some may stillexist.

## 2021-09-08 RX ORDER — ROPINIROLE 0.5 MG/1
TABLET, FILM COATED ORAL
Qty: 60 TABLET | Refills: 1 | Status: SHIPPED | OUTPATIENT
Start: 2021-09-08 | End: 2021-11-04

## 2021-11-04 RX ORDER — ROPINIROLE 0.5 MG/1
TABLET, FILM COATED ORAL
Qty: 60 TABLET | Refills: 1 | Status: SHIPPED | OUTPATIENT
Start: 2021-11-04 | End: 2022-01-31

## 2021-11-04 RX ORDER — RISPERIDONE 1 MG/1
TABLET, FILM COATED ORAL
Qty: 90 TABLET | Refills: 3 | Status: SHIPPED | OUTPATIENT
Start: 2021-11-04

## 2021-11-29 RX ORDER — AMLODIPINE BESYLATE 5 MG/1
TABLET ORAL
Qty: 90 TABLET | Refills: 1 | Status: SHIPPED | OUTPATIENT
Start: 2021-11-29

## 2022-01-31 RX ORDER — TOPIRAMATE 50 MG/1
TABLET, FILM COATED ORAL
Qty: 60 TABLET | Refills: 5 | Status: SHIPPED | OUTPATIENT
Start: 2022-01-31

## 2022-01-31 RX ORDER — ROPINIROLE 0.5 MG/1
TABLET, FILM COATED ORAL
Qty: 60 TABLET | Refills: 1 | Status: SHIPPED | OUTPATIENT
Start: 2022-01-31 | End: 2022-04-05

## 2022-03-09 ENCOUNTER — TELEPHONE (OUTPATIENT)
Dept: PRIMARY CARE CLINIC | Age: 67
End: 2022-03-09

## 2022-04-05 RX ORDER — ROPINIROLE 0.5 MG/1
TABLET, FILM COATED ORAL
Qty: 60 TABLET | Refills: 1 | Status: SHIPPED | OUTPATIENT
Start: 2022-04-05 | End: 2022-05-06

## 2022-05-06 RX ORDER — ROPINIROLE 0.5 MG/1
TABLET, FILM COATED ORAL
Qty: 60 TABLET | Refills: 1 | Status: SHIPPED | OUTPATIENT
Start: 2022-05-06 | End: 2022-08-24

## 2022-08-24 RX ORDER — ROPINIROLE 0.5 MG/1
TABLET, FILM COATED ORAL
Qty: 60 TABLET | Refills: 0 | Status: SHIPPED | OUTPATIENT
Start: 2022-08-24 | End: 2022-09-23

## 2022-09-23 RX ORDER — ROPINIROLE 0.5 MG/1
TABLET, FILM COATED ORAL
Qty: 60 TABLET | Refills: 0 | Status: SHIPPED | OUTPATIENT
Start: 2022-09-23 | End: 2022-10-21

## 2022-10-21 RX ORDER — ROPINIROLE 0.5 MG/1
TABLET, FILM COATED ORAL
Qty: 60 TABLET | Refills: 0 | Status: SHIPPED | OUTPATIENT
Start: 2022-10-21

## 2022-11-21 RX ORDER — ROPINIROLE 0.5 MG/1
TABLET, FILM COATED ORAL
Qty: 60 TABLET | Refills: 0 | OUTPATIENT
Start: 2022-11-21

## 2025-02-08 NOTE — TELEPHONE ENCOUNTER
Attempted to schedule AWV for Medicare. No answer at phone listed. Message left for pt to call the office to schedule.  used